# Patient Record
Sex: FEMALE | Race: BLACK OR AFRICAN AMERICAN | NOT HISPANIC OR LATINO | Employment: UNEMPLOYED | URBAN - METROPOLITAN AREA
[De-identification: names, ages, dates, MRNs, and addresses within clinical notes are randomized per-mention and may not be internally consistent; named-entity substitution may affect disease eponyms.]

---

## 2017-04-04 PROBLEM — E11.9 DIABETES MELLITUS: Status: ACTIVE | Noted: 2017-04-04

## 2018-05-08 PROBLEM — N39.46 MIXED INCONTINENCE: Status: ACTIVE | Noted: 2018-05-08

## 2018-06-26 ENCOUNTER — HOSPITAL ENCOUNTER (OUTPATIENT)
Dept: SLEEP MEDICINE | Facility: HOSPITAL | Age: 53
Discharge: HOME | End: 2018-06-26
Attending: INTERNAL MEDICINE
Payer: MEDICARE

## 2018-06-26 DIAGNOSIS — G47.8 SLEEP AROUSAL DISORDER: ICD-10-CM

## 2018-06-26 DIAGNOSIS — G47.33 OBSTRUCTIVE SLEEP APNEA (ADULT) (PEDIATRIC): ICD-10-CM

## 2018-06-26 PROCEDURE — 95810 POLYSOM 6/> YRS 4/> PARAM: CPT | Mod: 26,,, | Performed by: INTERNAL MEDICINE

## 2018-06-26 PROCEDURE — 95810 POLYSOM 6/> YRS 4/> PARAM: CPT

## 2018-08-22 ENCOUNTER — HOSPITAL ENCOUNTER (OUTPATIENT)
Dept: RADIOLOGY | Facility: HOSPITAL | Age: 53
Discharge: HOME | End: 2018-08-22
Attending: NURSE PRACTITIONER
Payer: MEDICARE

## 2018-08-22 DIAGNOSIS — R63.4 UNINTENTIONAL WEIGHT LOSS: ICD-10-CM

## 2018-08-22 DIAGNOSIS — R63.4 UNINTENTIONAL WEIGHT LOSS: Primary | ICD-10-CM

## 2018-08-22 PROCEDURE — 71046 X-RAY EXAM CHEST 2 VIEWS: CPT | Mod: TC,FY,PO

## 2018-08-24 ENCOUNTER — HOSPITAL ENCOUNTER (EMERGENCY)
Facility: HOSPITAL | Age: 53
Discharge: HOME | End: 2018-08-24
Attending: SURGERY
Payer: MEDICARE

## 2018-08-24 VITALS
TEMPERATURE: 98 F | OXYGEN SATURATION: 99 % | BODY MASS INDEX: 30.04 KG/M2 | SYSTOLIC BLOOD PRESSURE: 110 MMHG | HEIGHT: 60 IN | DIASTOLIC BLOOD PRESSURE: 60 MMHG | HEART RATE: 88 BPM | WEIGHT: 153 LBS | RESPIRATION RATE: 20 BRPM

## 2018-08-24 DIAGNOSIS — F20.0 PARANOID SCHIZOPHRENIA: ICD-10-CM

## 2018-08-24 DIAGNOSIS — R41.82 ALTERED MENTAL STATUS, UNSPECIFIED ALTERED MENTAL STATUS TYPE: Primary | ICD-10-CM

## 2018-08-24 DIAGNOSIS — F12.10 MARIJUANA ABUSE: ICD-10-CM

## 2018-08-24 LAB
ALBUMIN SERPL BCP-MCNC: 4.2 G/DL
ALP SERPL-CCNC: 69 U/L
ALT SERPL W/O P-5'-P-CCNC: 42 U/L
ANION GAP SERPL CALC-SCNC: 16 MMOL/L
AST SERPL-CCNC: 56 U/L
BASOPHILS # BLD AUTO: 0.02 K/UL
BASOPHILS NFR BLD: 0.2 %
BILIRUB SERPL-MCNC: 0.3 MG/DL
BUN SERPL-MCNC: 7 MG/DL
CALCIUM SERPL-MCNC: 9.1 MG/DL
CHLORIDE SERPL-SCNC: 103 MMOL/L
CO2 SERPL-SCNC: 18 MMOL/L
CREAT SERPL-MCNC: 0.79 MG/DL
DIFFERENTIAL METHOD: ABNORMAL
EOSINOPHIL # BLD AUTO: 0 K/UL
EOSINOPHIL NFR BLD: 0.3 %
ERYTHROCYTE [DISTWIDTH] IN BLOOD BY AUTOMATED COUNT: 12.7 %
EST. GFR  (AFRICAN AMERICAN): >60 ML/MIN/1.73 M^2
EST. GFR  (NON AFRICAN AMERICAN): >60 ML/MIN/1.73 M^2
GLUCOSE SERPL-MCNC: 91 MG/DL
HCT VFR BLD AUTO: 36.5 %
HGB BLD-MCNC: 12.7 G/DL
LYMPHOCYTES # BLD AUTO: 3.6 K/UL
LYMPHOCYTES NFR BLD: 31 %
MCH RBC QN AUTO: 31.7 PG
MCHC RBC AUTO-ENTMCNC: 34.8 G/DL
MCV RBC AUTO: 91 FL
MONOCYTES # BLD AUTO: 1.1 K/UL
MONOCYTES NFR BLD: 9 %
NEUTROPHILS # BLD AUTO: 7 K/UL
NEUTROPHILS NFR BLD: 59.3 %
PLATELET # BLD AUTO: 191 K/UL
PMV BLD AUTO: 10.3 FL
POTASSIUM SERPL-SCNC: 3.4 MMOL/L
PROT SERPL-MCNC: 7.8 G/DL
RBC # BLD AUTO: 4.01 M/UL
SODIUM SERPL-SCNC: 137 MMOL/L
WBC # BLD AUTO: 11.72 K/UL

## 2018-08-24 PROCEDURE — 25000003 PHARM REV CODE 250: Performed by: SURGERY

## 2018-08-24 PROCEDURE — 80053 COMPREHEN METABOLIC PANEL: CPT

## 2018-08-24 PROCEDURE — 85025 COMPLETE CBC W/AUTO DIFF WBC: CPT

## 2018-08-24 PROCEDURE — 96360 HYDRATION IV INFUSION INIT: CPT

## 2018-08-24 PROCEDURE — 99283 EMERGENCY DEPT VISIT LOW MDM: CPT | Mod: 25

## 2018-08-24 RX ORDER — LORAZEPAM 1 MG/1
0.5 TABLET ORAL EVERY 6 HOURS PRN
Qty: 10 TABLET | Refills: 0 | Status: SHIPPED | OUTPATIENT
Start: 2018-08-24 | End: 2019-03-18

## 2018-08-24 RX ORDER — PHENAZOPYRIDINE HYDROCHLORIDE 200 MG/1
200 TABLET, FILM COATED ORAL 3 TIMES DAILY
Qty: 10 TABLET | Refills: 0 | Status: SHIPPED | OUTPATIENT
Start: 2018-08-24 | End: 2018-09-03

## 2018-08-24 RX ADMIN — SODIUM CHLORIDE 1000 ML: 0.9 INJECTION, SOLUTION INTRAVENOUS at 05:08

## 2018-08-24 NOTE — ED NOTES
C/o blurred vision earlier today. States vision is normal now. Denies any pain or other complaints. BP low at 86/50. Pt appears high and admits to smoking a cigar with marijuana prior to arrival. Pt smells like marijuana.

## 2018-08-24 NOTE — ED PROVIDER NOTES
"Encounter Date: 2018       History     Chief Complaint   Patient presents with    Blurred Vision     Pt states started with blurred vision "a few minutes ago", denies N/V/D.  Pt denies weakness.  Pt states took Rx meds this am.       Patient had blurred vision after smoking a blunt 20 min prior to admission she has no headache or chest pain right now      The history is provided by the patient.   Toxidrome   The primary symptoms include altered mental status, visual change and dizziness. The current episode started just prior to arrival. This is a new problem. She was exposed to illicit drugs. The route of the exposure was oral ingestion. She was exposed to the toxic agent less than 1 hour ago.   The change in mental status began today.     Review of patient's allergies indicates:   Allergen Reactions    Naproxen Other (See Comments)     PATIENT EXPRESSED " THIS MEDICATION MAKES MY WHOLE BODY FEEL JITTERY."    Tramadol Other (See Comments)     PATIENT EXPRESSED " THIS MEDICATION MAKES MY WHOLE BODY FEEL JITTERY."     Past Medical History:   Diagnosis Date    Arthritis     Diabetes mellitus     Glaucoma     Hyperlipidemia     Hypertension     Mixed incontinence     Neuropathy     Schizophrenia, schizo-affective     Sleep apnea      Past Surgical History:   Procedure Laterality Date     SECTION      x1    GALL STONES      HERNIA REPAIR      HYSTERECTOMY       Family History   Problem Relation Age of Onset    Diabetes Father     Cancer Father     Diabetes Mother      Social History     Tobacco Use    Smoking status: Never Smoker    Smokeless tobacco: Never Used   Substance Use Topics    Alcohol use: Yes     Comment: beer-2-3/day    Drug use: Yes     Types: Marijuana     Review of Systems   Constitutional: Negative.    HENT: Negative.    Eyes: Negative.    Respiratory: Negative.    Cardiovascular: Negative.    Endocrine: Negative.    Genitourinary: Negative.    Musculoskeletal: " Negative.    Allergic/Immunologic: Negative.    Neurological: Positive for dizziness.   Psychiatric/Behavioral: Positive for agitation and decreased concentration.       Physical Exam     Initial Vitals [08/24/18 1617]   BP Pulse Resp Temp SpO2   (!) 82/53 97 20 97.6 °F (36.4 °C) 99 %      MAP       --         Physical Exam    Nursing note and vitals reviewed.  Constitutional: She appears well-developed and well-nourished.   HENT:   Head: Normocephalic.   Eyes: Conjunctivae are normal.   Cardiovascular: Normal rate, regular rhythm and normal heart sounds.   Pulmonary/Chest: Breath sounds normal.   Abdominal: Soft.   Neurological: She is alert.   Skin: Skin is dry.   Psychiatric: She has a normal mood and affect.         ED Course   Procedures  Labs Reviewed   CBC W/ AUTO DIFFERENTIAL - Abnormal; Notable for the following components:       Result Value    Hematocrit 36.5 (*)     MCH 31.7 (*)     Mono # 1.1 (*)     All other components within normal limits   COMPREHENSIVE METABOLIC PANEL          Imaging Results    None          Medical Decision Making:   Initial Assessment:   Altered mental status which resolved in ED  ED Management:  Recommend follow-up with primary doctor  Other:   I discussed test(s) with the performing physician.                      Clinical Impression:   The primary encounter diagnosis was Altered mental status, unspecified altered mental status type. Diagnoses of Paranoid schizophrenia and Marijuana abuse were also pertinent to this visit.      Disposition:   Disposition: Discharged  Condition: Stable                        CFlory Ingram III, MD  08/24/18 3530

## 2018-08-24 NOTE — ED PROVIDER NOTES
"Encounter Date: 2018       History     Chief Complaint   Patient presents with    Blurred Vision     Pt states started with blurred vision "a few minutes ago", denies N/V/D.  Pt denies weakness.  Pt states took Rx meds this am.       HPI  Review of patient's allergies indicates:   Allergen Reactions    Naproxen Other (See Comments)     PATIENT EXPRESSED " THIS MEDICATION MAKES MY WHOLE BODY FEEL JITTERY."    Tramadol Other (See Comments)     PATIENT EXPRESSED " THIS MEDICATION MAKES MY WHOLE BODY FEEL JITTERY."     Past Medical History:   Diagnosis Date    Arthritis     Diabetes mellitus     Glaucoma     Hyperlipidemia     Hypertension     Mixed incontinence     Neuropathy     Schizophrenia, schizo-affective     Sleep apnea      Past Surgical History:   Procedure Laterality Date     SECTION      x1    GALL STONES      HERNIA REPAIR      HYSTERECTOMY       Family History   Problem Relation Age of Onset    Diabetes Father     Cancer Father     Diabetes Mother      Social History     Tobacco Use    Smoking status: Never Smoker    Smokeless tobacco: Never Used   Substance Use Topics    Alcohol use: Yes     Comment: beer-2-3/day    Drug use: Yes     Types: Marijuana     Review of Systems    Physical Exam     Initial Vitals [18 1617]   BP Pulse Resp Temp SpO2   (!) 82/53 97 20 97.6 °F (36.4 °C) 99 %      MAP       --         Physical Exam    ED Course   Procedures  Labs Reviewed   CBC W/ AUTO DIFFERENTIAL - Abnormal; Notable for the following components:       Result Value    Hematocrit 36.5 (*)     MCH 31.7 (*)     Mono # 1.1 (*)     All other components within normal limits   COMPREHENSIVE METABOLIC PANEL          Imaging Results    None                               Clinical Impression:   The primary encounter diagnosis was Altered mental status, unspecified altered mental status type. Diagnoses of Paranoid schizophrenia and Marijuana abuse were also pertinent to this " visit.                             Zay Rosales MD  08/30/18 0521

## 2018-12-13 ENCOUNTER — LAB VISIT (OUTPATIENT)
Dept: LAB | Facility: HOSPITAL | Age: 53
End: 2018-12-13
Attending: PSYCHIATRY & NEUROLOGY
Payer: MEDICARE

## 2018-12-13 DIAGNOSIS — F20.0 PARANOID SCHIZOPHRENIA: Primary | ICD-10-CM

## 2018-12-13 DIAGNOSIS — Z79.899 OTHER LONG TERM (CURRENT) DRUG THERAPY: ICD-10-CM

## 2018-12-13 LAB
ALBUMIN SERPL BCP-MCNC: 4.3 G/DL
ALP SERPL-CCNC: 101 U/L
ALT SERPL W/O P-5'-P-CCNC: 30 U/L
ANION GAP SERPL CALC-SCNC: 8 MMOL/L
AST SERPL-CCNC: 28 U/L
BASOPHILS # BLD AUTO: 0.02 K/UL
BASOPHILS NFR BLD: 0.3 %
BILIRUB SERPL-MCNC: 0.4 MG/DL
BUN SERPL-MCNC: 11 MG/DL
CALCIUM SERPL-MCNC: 9.6 MG/DL
CHLORIDE SERPL-SCNC: 101 MMOL/L
CHOLEST SERPL-MCNC: 238 MG/DL
CHOLEST/HDLC SERPL: 4.3 {RATIO}
CO2 SERPL-SCNC: 28 MMOL/L
CREAT SERPL-MCNC: 0.73 MG/DL
DIFFERENTIAL METHOD: ABNORMAL
EOSINOPHIL # BLD AUTO: 0 K/UL
EOSINOPHIL NFR BLD: 0.5 %
ERYTHROCYTE [DISTWIDTH] IN BLOOD BY AUTOMATED COUNT: 12.7 %
EST. GFR  (AFRICAN AMERICAN): >60 ML/MIN/1.73 M^2
EST. GFR  (NON AFRICAN AMERICAN): >60 ML/MIN/1.73 M^2
GLUCOSE SERPL-MCNC: 124 MG/DL
HCT VFR BLD AUTO: 37.7 %
HDLC SERPL-MCNC: 55 MG/DL
HDLC SERPL: 23.1 %
HGB BLD-MCNC: 12.9 G/DL
LDLC SERPL CALC-MCNC: 166.8 MG/DL
LYMPHOCYTES # BLD AUTO: 3.4 K/UL
LYMPHOCYTES NFR BLD: 42.7 %
MCH RBC QN AUTO: 32.2 PG
MCHC RBC AUTO-ENTMCNC: 34.2 G/DL
MCV RBC AUTO: 94 FL
MONOCYTES # BLD AUTO: 0.6 K/UL
MONOCYTES NFR BLD: 7.8 %
NEUTROPHILS # BLD AUTO: 3.9 K/UL
NEUTROPHILS NFR BLD: 48.4 %
NONHDLC SERPL-MCNC: 183 MG/DL
PLATELET # BLD AUTO: 221 K/UL
PMV BLD AUTO: 9.9 FL
POTASSIUM SERPL-SCNC: 4.3 MMOL/L
PROT SERPL-MCNC: 8.1 G/DL
RBC # BLD AUTO: 4.01 M/UL
SODIUM SERPL-SCNC: 137 MMOL/L
T4 FREE SERPL-MCNC: 1.02 NG/DL
TRIGL SERPL-MCNC: 81 MG/DL
TSH SERPL DL<=0.005 MIU/L-ACNC: 4.14 UIU/ML
WBC # BLD AUTO: 7.96 K/UL

## 2018-12-13 PROCEDURE — 80061 LIPID PANEL: CPT

## 2018-12-13 PROCEDURE — 36415 COLL VENOUS BLD VENIPUNCTURE: CPT | Mod: PO

## 2018-12-13 PROCEDURE — 84439 ASSAY OF FREE THYROXINE: CPT

## 2018-12-13 PROCEDURE — 80053 COMPREHEN METABOLIC PANEL: CPT | Mod: PO

## 2018-12-13 PROCEDURE — 84443 ASSAY THYROID STIM HORMONE: CPT | Mod: PO

## 2018-12-13 PROCEDURE — 85025 COMPLETE CBC W/AUTO DIFF WBC: CPT | Mod: PO

## 2019-03-18 PROBLEM — R06.83 SNORING: Status: ACTIVE | Noted: 2019-03-18

## 2020-03-14 ENCOUNTER — OUTSIDE PLACE OF SERVICE (OUTPATIENT)
Dept: CARDIOLOGY | Facility: CLINIC | Age: 55
End: 2020-03-14
Payer: MEDICARE

## 2020-03-14 PROCEDURE — 93010 ELECTROCARDIOGRAM REPORT: CPT | Mod: S$GLB,,, | Performed by: INTERNAL MEDICINE

## 2020-03-14 PROCEDURE — 93010 PR ELECTROCARDIOGRAM REPORT: ICD-10-PCS | Mod: S$GLB,,, | Performed by: INTERNAL MEDICINE

## 2021-08-16 ENCOUNTER — HOSPITAL ENCOUNTER (OUTPATIENT)
Dept: RADIOLOGY | Facility: HOSPITAL | Age: 56
Discharge: HOME | End: 2021-08-16
Attending: NURSE PRACTITIONER
Payer: MEDICARE

## 2021-08-16 DIAGNOSIS — M79.673 HEEL PAIN: ICD-10-CM

## 2021-08-16 PROCEDURE — 73630 X-RAY EXAM OF FOOT: CPT | Mod: TC,FY,PO,RT

## 2022-04-06 ENCOUNTER — TELEPHONE (OUTPATIENT)
Dept: FAMILY MEDICINE | Facility: CLINIC | Age: 57
End: 2022-04-06
Payer: MEDICAID

## 2022-10-21 DIAGNOSIS — Z12.31 ENCOUNTER FOR SCREENING MAMMOGRAM FOR MALIGNANT NEOPLASM OF BREAST: Primary | ICD-10-CM

## 2022-11-11 ENCOUNTER — HOSPITAL ENCOUNTER (OUTPATIENT)
Dept: RADIOLOGY | Facility: HOSPITAL | Age: 57
Discharge: HOME | End: 2022-11-11
Attending: NURSE PRACTITIONER
Payer: MEDICARE

## 2022-11-11 DIAGNOSIS — Z12.31 ENCOUNTER FOR SCREENING MAMMOGRAM FOR MALIGNANT NEOPLASM OF BREAST: ICD-10-CM

## 2022-11-11 PROCEDURE — 77063 BREAST TOMOSYNTHESIS BI: CPT | Mod: TC,PO

## 2022-11-11 PROCEDURE — 77067 SCR MAMMO BI INCL CAD: CPT | Mod: TC,PO

## 2022-12-23 ENCOUNTER — LAB VISIT (OUTPATIENT)
Dept: LAB | Facility: HOSPITAL | Age: 57
End: 2022-12-23
Attending: PSYCHIATRY & NEUROLOGY
Payer: MEDICARE

## 2022-12-23 DIAGNOSIS — Z79.899 OTHER LONG TERM (CURRENT) DRUG THERAPY: ICD-10-CM

## 2022-12-23 DIAGNOSIS — F20.0 PARANOID SCHIZOPHRENIA: Primary | ICD-10-CM

## 2022-12-23 LAB
ALBUMIN SERPL BCP-MCNC: 4.3 G/DL (ref 3.5–5.2)
ALP SERPL-CCNC: 137 U/L (ref 38–126)
ALT SERPL W/O P-5'-P-CCNC: 48 U/L (ref 10–44)
ANION GAP SERPL CALC-SCNC: 5 MMOL/L (ref 8–16)
AST SERPL-CCNC: 36 U/L (ref 15–46)
BASOPHILS # BLD AUTO: 0.03 K/UL (ref 0–0.2)
BASOPHILS NFR BLD: 0.3 % (ref 0–1.9)
BILIRUB SERPL-MCNC: 0.5 MG/DL (ref 0.1–1)
CALCIUM SERPL-MCNC: 9.1 MG/DL (ref 8.7–10.5)
CHLORIDE SERPL-SCNC: 106 MMOL/L (ref 95–110)
CHOLEST SERPL-MCNC: 208 MG/DL (ref 120–199)
CHOLEST/HDLC SERPL: 3.4 {RATIO} (ref 2–5)
CO2 SERPL-SCNC: 30 MMOL/L (ref 23–29)
CREAT SERPL-MCNC: 0.65 MG/DL (ref 0.5–1.4)
DIFFERENTIAL METHOD: ABNORMAL
EOSINOPHIL # BLD AUTO: 0.1 K/UL (ref 0–0.5)
EOSINOPHIL NFR BLD: 0.8 % (ref 0–8)
ERYTHROCYTE [DISTWIDTH] IN BLOOD BY AUTOMATED COUNT: 12.4 % (ref 11.5–14.5)
EST. GFR  (NO RACE VARIABLE): >60 ML/MIN/1.73 M^2
ESTIMATED AVG GLUCOSE: 154 MG/DL (ref 68–131)
GLUCOSE SERPL-MCNC: 167 MG/DL (ref 70–110)
HBA1C MFR BLD: 7 % (ref 4–5.6)
HCT VFR BLD AUTO: 36.4 % (ref 37–48.5)
HDLC SERPL-MCNC: 61 MG/DL (ref 40–75)
HDLC SERPL: 29.3 % (ref 20–50)
HGB BLD-MCNC: 12.7 G/DL (ref 12–16)
IMM GRANULOCYTES # BLD AUTO: 0.04 K/UL (ref 0–0.04)
IMM GRANULOCYTES NFR BLD AUTO: 0.3 % (ref 0–0.5)
LDLC SERPL CALC-MCNC: 133.6 MG/DL (ref 63–159)
LYMPHOCYTES # BLD AUTO: 4.1 K/UL (ref 1–4.8)
LYMPHOCYTES NFR BLD: 35.6 % (ref 18–48)
MCH RBC QN AUTO: 32.8 PG (ref 27–31)
MCHC RBC AUTO-ENTMCNC: 34.9 G/DL (ref 32–36)
MCV RBC AUTO: 94 FL (ref 82–98)
MONOCYTES # BLD AUTO: 0.8 K/UL (ref 0.3–1)
MONOCYTES NFR BLD: 6.6 % (ref 4–15)
NEUTROPHILS # BLD AUTO: 6.5 K/UL (ref 1.8–7.7)
NEUTROPHILS NFR BLD: 56.4 % (ref 38–73)
NONHDLC SERPL-MCNC: 147 MG/DL
NRBC BLD-RTO: 0 /100 WBC
PLATELET # BLD AUTO: 157 K/UL (ref 150–450)
PMV BLD AUTO: 9.1 FL (ref 9.2–12.9)
POTASSIUM SERPL-SCNC: 4.3 MMOL/L (ref 3.5–5.1)
PROT SERPL-MCNC: 7.4 G/DL (ref 6–8.4)
RBC # BLD AUTO: 3.87 M/UL (ref 4–5.4)
SODIUM SERPL-SCNC: 141 MMOL/L (ref 136–145)
T4 FREE SERPL-MCNC: 0.84 NG/DL (ref 0.71–1.51)
TRIGL SERPL-MCNC: 67 MG/DL (ref 30–150)
TSH SERPL DL<=0.005 MIU/L-ACNC: 2.3 UIU/ML (ref 0.4–4)
UUN UR-MCNC: 8 MG/DL (ref 7–17)
WBC # BLD AUTO: 11.53 K/UL (ref 3.9–12.7)

## 2022-12-23 PROCEDURE — 84443 ASSAY THYROID STIM HORMONE: CPT | Mod: PO | Performed by: PSYCHIATRY & NEUROLOGY

## 2022-12-23 PROCEDURE — 84439 ASSAY OF FREE THYROXINE: CPT | Performed by: PSYCHIATRY & NEUROLOGY

## 2022-12-23 PROCEDURE — 85025 COMPLETE CBC W/AUTO DIFF WBC: CPT | Mod: PO | Performed by: PSYCHIATRY & NEUROLOGY

## 2022-12-23 PROCEDURE — 80053 COMPREHEN METABOLIC PANEL: CPT | Mod: PO | Performed by: PSYCHIATRY & NEUROLOGY

## 2022-12-23 PROCEDURE — 36415 COLL VENOUS BLD VENIPUNCTURE: CPT | Mod: PO | Performed by: PSYCHIATRY & NEUROLOGY

## 2022-12-23 PROCEDURE — 83036 HEMOGLOBIN GLYCOSYLATED A1C: CPT | Performed by: PSYCHIATRY & NEUROLOGY

## 2022-12-23 PROCEDURE — 80061 LIPID PANEL: CPT | Performed by: PSYCHIATRY & NEUROLOGY

## 2023-03-04 ENCOUNTER — HOSPITAL ENCOUNTER (EMERGENCY)
Facility: HOSPITAL | Age: 58
Discharge: HOME | End: 2023-03-04
Attending: EMERGENCY MEDICINE
Payer: MEDICARE

## 2023-03-04 VITALS
HEART RATE: 83 BPM | HEIGHT: 60 IN | BODY MASS INDEX: 31.02 KG/M2 | DIASTOLIC BLOOD PRESSURE: 88 MMHG | TEMPERATURE: 98 F | RESPIRATION RATE: 16 BRPM | OXYGEN SATURATION: 98 % | WEIGHT: 158 LBS | SYSTOLIC BLOOD PRESSURE: 169 MMHG

## 2023-03-04 DIAGNOSIS — R10.13 EPIGASTRIC ABDOMINAL PAIN: Primary | ICD-10-CM

## 2023-03-04 LAB
ALBUMIN SERPL BCP-MCNC: 4.4 G/DL (ref 3.5–5.2)
ALP SERPL-CCNC: 121 U/L (ref 38–126)
ALT SERPL W/O P-5'-P-CCNC: 38 U/L (ref 10–44)
ANION GAP SERPL CALC-SCNC: 6 MMOL/L (ref 8–16)
AST SERPL-CCNC: 35 U/L (ref 15–46)
BASOPHILS # BLD AUTO: 0.02 K/UL (ref 0–0.2)
BASOPHILS NFR BLD: 0.3 % (ref 0–1.9)
BILIRUB SERPL-MCNC: 0.5 MG/DL (ref 0.1–1)
BILIRUB UR QL STRIP: NEGATIVE
CALCIUM SERPL-MCNC: 8.9 MG/DL (ref 8.7–10.5)
CHLORIDE SERPL-SCNC: 105 MMOL/L (ref 95–110)
CLARITY UR REFRACT.AUTO: CLEAR
CO2 SERPL-SCNC: 29 MMOL/L (ref 23–29)
COLOR UR AUTO: YELLOW
CREAT SERPL-MCNC: 0.61 MG/DL (ref 0.5–1.4)
DIFFERENTIAL METHOD: ABNORMAL
EOSINOPHIL # BLD AUTO: 0.1 K/UL (ref 0–0.5)
EOSINOPHIL NFR BLD: 1.5 % (ref 0–8)
ERYTHROCYTE [DISTWIDTH] IN BLOOD BY AUTOMATED COUNT: 12 % (ref 11.5–14.5)
EST. GFR  (NO RACE VARIABLE): >60 ML/MIN/1.73 M^2
GLUCOSE SERPL-MCNC: 170 MG/DL (ref 70–110)
GLUCOSE UR QL STRIP: NEGATIVE
HCT VFR BLD AUTO: 36.1 % (ref 37–48.5)
HGB BLD-MCNC: 12.4 G/DL (ref 12–16)
HGB UR QL STRIP: ABNORMAL
IMM GRANULOCYTES # BLD AUTO: 0.01 K/UL (ref 0–0.04)
IMM GRANULOCYTES NFR BLD AUTO: 0.2 % (ref 0–0.5)
KETONES UR QL STRIP: NEGATIVE
LEUKOCYTE ESTERASE UR QL STRIP: NEGATIVE
LIPASE SERPL-CCNC: 63 U/L (ref 23–300)
LYMPHOCYTES # BLD AUTO: 2.6 K/UL (ref 1–4.8)
LYMPHOCYTES NFR BLD: 39.8 % (ref 18–48)
MCH RBC QN AUTO: 32.3 PG (ref 27–31)
MCHC RBC AUTO-ENTMCNC: 34.3 G/DL (ref 32–36)
MCV RBC AUTO: 94 FL (ref 82–98)
MONOCYTES # BLD AUTO: 0.6 K/UL (ref 0.3–1)
MONOCYTES NFR BLD: 9.3 % (ref 4–15)
NEUTROPHILS # BLD AUTO: 3.2 K/UL (ref 1.8–7.7)
NEUTROPHILS NFR BLD: 48.9 % (ref 38–73)
NITRITE UR QL STRIP: NEGATIVE
NRBC BLD-RTO: 0 /100 WBC
PH UR STRIP: 7 [PH] (ref 5–8)
PLATELET # BLD AUTO: 135 K/UL (ref 150–450)
PMV BLD AUTO: 9.8 FL (ref 9.2–12.9)
POTASSIUM SERPL-SCNC: 3.9 MMOL/L (ref 3.5–5.1)
PROT SERPL-MCNC: 7.8 G/DL (ref 6–8.4)
PROT UR QL STRIP: NEGATIVE
RBC # BLD AUTO: 3.84 M/UL (ref 4–5.4)
SODIUM SERPL-SCNC: 140 MMOL/L (ref 136–145)
SP GR UR STRIP: 1.01 (ref 1–1.03)
URN SPEC COLLECT METH UR: ABNORMAL
UROBILINOGEN UR STRIP-ACNC: NEGATIVE EU/DL
UUN UR-MCNC: 4 MG/DL (ref 7–17)
WBC # BLD AUTO: 6.54 K/UL (ref 3.9–12.7)

## 2023-03-04 PROCEDURE — 80053 COMPREHEN METABOLIC PANEL: CPT | Mod: ER | Performed by: EMERGENCY MEDICINE

## 2023-03-04 PROCEDURE — 83690 ASSAY OF LIPASE: CPT | Mod: ER | Performed by: EMERGENCY MEDICINE

## 2023-03-04 PROCEDURE — 81003 URINALYSIS AUTO W/O SCOPE: CPT | Mod: ER | Performed by: EMERGENCY MEDICINE

## 2023-03-04 PROCEDURE — 99284 EMERGENCY DEPT VISIT MOD MDM: CPT | Mod: ER

## 2023-03-04 PROCEDURE — 93005 ELECTROCARDIOGRAM TRACING: CPT | Mod: ER

## 2023-03-04 PROCEDURE — 93010 ELECTROCARDIOGRAM REPORT: CPT | Mod: ,,, | Performed by: INTERNAL MEDICINE

## 2023-03-04 PROCEDURE — 85025 COMPLETE CBC W/AUTO DIFF WBC: CPT | Mod: ER | Performed by: EMERGENCY MEDICINE

## 2023-03-04 PROCEDURE — 93010 EKG 12-LEAD: ICD-10-PCS | Mod: ,,, | Performed by: INTERNAL MEDICINE

## 2023-03-04 RX ORDER — PANTOPRAZOLE SODIUM 20 MG/1
20 TABLET, DELAYED RELEASE ORAL DAILY
Qty: 30 TABLET | Refills: 0 | Status: ON HOLD | OUTPATIENT
Start: 2023-03-04 | End: 2023-12-05 | Stop reason: HOSPADM

## 2023-03-04 NOTE — ED NOTES
"Pt to ED with CC of generalized upper abdominal cramping x "months. I just haven't gone to the doctor." No known aggravating factors. States "it just comes." No N/V/D. Denies urinary symptoms. Denies radiation of pain. Denies localization of pain - "sometimes it's up here (LUQ), here (epigastrium), or here (RUQ)." No tenderness noted with palpation. Abdomen soft and non-distended. Pt denies pain/symptoms at this time.  "

## 2023-03-04 NOTE — Clinical Note
"Katelynn Nagy" Castro was seen and treated in our emergency department on 3/4/2023.  She may return to work on 03/06/2023.       If you have any questions or concerns, please don't hesitate to call.      Latasha Morales RN    "

## 2023-03-04 NOTE — ED NOTES
Pt comfortably lying in bed. Respirations even and unlabored. Skin warm and dry. No pain reported at this time. NAD noted. Vital signs remain stable. Call light remains in reach. Pt instructed to notify staff should needs arise. Understanding verbalized.

## 2023-03-04 NOTE — PROVIDER PROGRESS NOTES - EMERGENCY DEPT.
Encounter Date: 3/4/2023    ED Physician Progress Notes        Physician Note:   Care assumed from Dr. Cobian at shift change.  57-year-old with intermittent abdominal pain for several weeks.  Vital signs unremarkable.  Patient without pain while in the ED.  Labs show no significant abnormalities.  No indication for imaging at this time.  Abdomen remains benign.  Low suspicion for acute life-threatening illness.  Will discharge patient home with prescription for Protonix and referral to GI for further evaluation.

## 2023-03-04 NOTE — ED PROVIDER NOTES
"ED Provider Note - 3/4/2023    History     Chief Complaint   Patient presents with    Abdominal Pain     Pt to the Er with episodic "cramping in stomach." Pt reports that this has been going on "longer than a month." First episode tonight about 8pm. Pt went lay down, but it woke her again prior to coming to the ER. Pt denies pain in triage.      Patient currently presents with complaint of abdominal pain.  Onset of this event was first noted several weeks ago though symptoms are intermittent averaging once every 1-2 days.  Episodes typically last about 5-10 min each.  This discomfort is reported to be primarily epigastric.  This discomfort is described as a aching and sharp sensation.  Patient notes no associated symptoms.  PSH notable for prior lap cholecystectomy and hysterectomy as well as ventral hernia repair.  PMH notable for DM and HTN.          Review of patient's allergies indicates:   Allergen Reactions    Naproxen Other (See Comments)     PATIENT EXPRESSED " THIS MEDICATION MAKES MY WHOLE BODY FEEL JITTERY."    Tramadol Other (See Comments)     PATIENT EXPRESSED " THIS MEDICATION MAKES MY WHOLE BODY FEEL JITTERY."     Past Medical History:   Diagnosis Date    Arthritis     Diabetes mellitus     Glaucoma     Hyperlipidemia     Hypertension     Mixed incontinence     Neuropathy     Schizophrenia, schizo-affective     Sleep apnea      Past Surgical History:   Procedure Laterality Date     SECTION      x1    CYSTOSCOPY  2018    Procedure: CYSTOSCOPY;  Surgeon: Bebeto Chaves II, MD;  Location: [facility];  Service: Urology;;    GALL STONES      HERNIA REPAIR      HYSTERECTOMY      INSERTION OF MIDURETHRAL SLING N/A 2018    Procedure: SLING-MID URETHRAL;  Surgeon: Bebeto Chaves II, MD;  Location: [facility] OR;  Service: Urology;  Laterality: N/A;  Site: midurethral     Family History   Problem Relation Age of Onset    Diabetes Father     Cancer Father     Diabetes Mother      Social History "     Tobacco Use    Smoking status: Never    Smokeless tobacco: Never   Substance Use Topics    Alcohol use: Yes     Comment: beer-2-3/day    Drug use: Yes     Types: Marijuana     Review of Systems   Constitutional:  Negative for chills and fever.   HENT:  Negative for congestion and rhinorrhea.    Respiratory:  Negative for cough and shortness of breath.    Cardiovascular:  Negative for chest pain and palpitations.   Gastrointestinal:  Positive for abdominal pain. Negative for diarrhea and vomiting.   Genitourinary:  Negative for difficulty urinating and dysuria.   Skin:  Negative for color change and rash.   Neurological:  Negative for dizziness and light-headedness.   Hematological:  Negative for adenopathy. Does not bruise/bleed easily.     Physical Exam     Initial Vitals [03/04/23 0349]   BP Pulse Resp Temp SpO2   (!) 140/76 83 18 98.3 °F (36.8 °C) 98 %      MAP       --         Vitals:    03/04/23 0349 03/04/23 0402 03/04/23 0502 03/04/23 0602   BP: (!) 140/76 (!) 148/67 (!) 153/75 (!) 162/83   Pulse: 83 77 78 74   Resp: 18   15   Temp: 98.3 °F (36.8 °C)      TempSrc: Oral      SpO2: 98% 98% 99% 97%   Weight: 71.7 kg (158 lb)      Height: 5' (1.524 m)       03/04/23 0701 03/04/23 0703   BP:  (!) 169/88   Pulse: 76 83   Resp: 16    Temp:     TempSrc:     SpO2: 98% 98%   Weight:     Height:       Physical Exam    Nursing note and vitals reviewed.  Constitutional: She appears well-developed and well-nourished. She is not diaphoretic. No distress.   HENT:   Head: Normocephalic and atraumatic.   Nose: Nose normal.   Mouth/Throat: Oropharynx is clear and moist.   Eyes: Conjunctivae are normal. No scleral icterus.   Cardiovascular:  Normal rate, regular rhythm, normal heart sounds and intact distal pulses.           Pulmonary/Chest: Breath sounds normal. No respiratory distress.   Abdominal: Abdomen is soft. She exhibits no distension. There is no abdominal tenderness.   Musculoskeletal:         General: No edema.  Normal range of motion.     Neurological: She is alert and oriented to person, place, and time. She has normal strength.   Skin: Skin is warm and dry.       ED Course   Procedures                   MDM  History Acquisition     The patient's list of active medical problems, social history, medications, and allergies as documented per RN staff has been reviewed.       Differential Diagnoses   Based on available information and the initial assessment, the working differential diagnoses considered during this evaluation include but are not limited to constipation,  pancreatitis, PUD, gastritis, enteritis/colitis, and GERD.      LABS     Labs Reviewed   CBC W/ AUTO DIFFERENTIAL - Abnormal; Notable for the following components:       Result Value    RBC 3.84 (*)     Hematocrit 36.1 (*)     MCH 32.3 (*)     Platelets 135 (*)     All other components within normal limits   COMPREHENSIVE METABOLIC PANEL - Abnormal; Notable for the following components:    Glucose 170 (*)     BUN 4 (*)     Anion Gap 6 (*)     All other components within normal limits   URINALYSIS, REFLEX TO URINE CULTURE - Abnormal; Notable for the following components:    Occult Blood UA Trace (*)     All other components within normal limits    Narrative:     Preferred Collection Type->Urine, Clean Catch  Specimen Source->Urine   LIPASE     All available results from the labs ordered were independently reviewed.  No apparent abnormalities of clinical relevancy were noted from the CBC, CMP, lipase, and UAR.  Notable abnormalities were appreciated in none of the present labwork.    Imaging     Imaging Results    None            EKG   EKG Readings: (Independently Interpreted)   Initial Reading: No STEMI. Rhythm: Normal Sinus Rhythm. Heart Rate: 87. Ectopy: No Ectopy. Conduction: Normal. Axis: Normal.     Additional Consideration           Medications - No data to display                ED Management/Discussion   Following discussion of all pertinent details  presently available from the patient's encounter, the patient was transitioned into the care of Dr.S Gibbs for ongoing evaluation and management pending completion of workup.      CLINICAL IMPRESSION    ICD-10-CM ICD-9-CM   1. Epigastric abdominal pain  R10.13 789.06          ED Disposition Condition    Discharge Stable               Isaiah Cobian MD  03/04/23 1553       Isaiah Cobian MD  03/04/23 4627

## 2023-11-07 ENCOUNTER — HOSPITAL ENCOUNTER (EMERGENCY)
Facility: HOSPITAL | Age: 58
Discharge: HOME | End: 2023-11-07
Attending: EMERGENCY MEDICINE
Payer: MEDICARE

## 2023-11-07 ENCOUNTER — HOSPITAL ENCOUNTER (OUTPATIENT)
Dept: RADIOLOGY | Facility: HOSPITAL | Age: 58
Discharge: HOME | End: 2023-11-07
Attending: NURSE PRACTITIONER
Payer: MEDICARE

## 2023-11-07 VITALS
TEMPERATURE: 98 F | HEIGHT: 60 IN | BODY MASS INDEX: 31.41 KG/M2 | DIASTOLIC BLOOD PRESSURE: 73 MMHG | RESPIRATION RATE: 18 BRPM | SYSTOLIC BLOOD PRESSURE: 112 MMHG | HEART RATE: 92 BPM | WEIGHT: 160 LBS | OXYGEN SATURATION: 99 %

## 2023-11-07 DIAGNOSIS — M79.604 RIGHT LEG PAIN: Primary | ICD-10-CM

## 2023-11-07 DIAGNOSIS — Z78.0 ASYMPTOMATIC POSTMENOPAUSAL STATE: ICD-10-CM

## 2023-11-07 DIAGNOSIS — M79.605 LEFT LEG PAIN: ICD-10-CM

## 2023-11-07 DIAGNOSIS — R29.6 FREQUENT FALLS: ICD-10-CM

## 2023-11-07 PROCEDURE — 77081 DXA BONE DENSITY APPENDICULR: CPT | Mod: TC,PN

## 2023-11-07 PROCEDURE — 63600175 PHARM REV CODE 636 W HCPCS: Mod: ER | Performed by: PHYSICIAN ASSISTANT

## 2023-11-07 PROCEDURE — 77081 DEXA BONE DENSITY APPENDICULAR SKELETON: ICD-10-PCS | Mod: 26,,, | Performed by: RADIOLOGY

## 2023-11-07 PROCEDURE — 77081 DXA BONE DENSITY APPENDICULR: CPT | Mod: 26,,, | Performed by: RADIOLOGY

## 2023-11-07 PROCEDURE — 96372 THER/PROPH/DIAG INJ SC/IM: CPT | Performed by: PHYSICIAN ASSISTANT

## 2023-11-07 PROCEDURE — 99284 EMERGENCY DEPT VISIT MOD MDM: CPT | Mod: 25,ER

## 2023-11-07 RX ORDER — KETOROLAC TROMETHAMINE 10 MG/1
10 TABLET, FILM COATED ORAL 2 TIMES DAILY
Qty: 10 TABLET | Refills: 0 | Status: SHIPPED | OUTPATIENT
Start: 2023-11-07 | End: 2023-11-12

## 2023-11-07 RX ORDER — KETOROLAC TROMETHAMINE 30 MG/ML
15 INJECTION, SOLUTION INTRAMUSCULAR; INTRAVENOUS
Status: COMPLETED | OUTPATIENT
Start: 2023-11-07 | End: 2023-11-07

## 2023-11-07 RX ADMIN — KETOROLAC TROMETHAMINE 15 MG: 30 INJECTION, SOLUTION INTRAMUSCULAR; INTRAVENOUS at 09:11

## 2023-11-07 NOTE — ED PROVIDER NOTES
"Encounter Date: 2023       History     Chief Complaint   Patient presents with    Leg Pain     Pt C/O "bone" pain to BLE from knee to feet X months.  Pt denies injury.  No abnormalities noted.      58-year-old patient presents with pain in both legs.  Patient denies any injury.  Patient states that she is been having this on and for months.  Patient does have a history of diabetes but states she no longer has it.  Patient states that she had a bone density scan test done today by her primary care.  The patient's results with his test are not done yet.  Patient has taken Tylenol and states that it has not helped with the pain.  Patient denied any other complaints.    The history is provided by the patient.     Review of patient's allergies indicates:   Allergen Reactions    Naproxen Other (See Comments)     PATIENT EXPRESSED " THIS MEDICATION MAKES MY WHOLE BODY FEEL JITTERY."    Tramadol Other (See Comments)     PATIENT EXPRESSED " THIS MEDICATION MAKES MY WHOLE BODY FEEL JITTERY."     Past Medical History:   Diagnosis Date    Arthritis     Diabetes mellitus     Glaucoma     Hyperlipidemia     Hypertension     Mixed incontinence     Neuropathy     Schizophrenia, schizo-affective     Sleep apnea      Past Surgical History:   Procedure Laterality Date     SECTION      x1    CYSTOSCOPY  2018    Procedure: CYSTOSCOPY;  Surgeon: Bebeto Chaves II, MD;  Location: [facility];  Service: Urology;;    GALL STONES      HERNIA REPAIR      HYSTERECTOMY      INSERTION OF MIDURETHRAL SLING N/A 2018    Procedure: SLING-MID URETHRAL;  Surgeon: Bebeto Chaves II, MD;  Location: [facility];  Service: Urology;  Laterality: N/A;  Site: midurethral     Family History   Problem Relation Age of Onset    Diabetes Father     Cancer Father     Diabetes Mother      Social History     Tobacco Use    Smoking status: Never    Smokeless tobacco: Never   Substance Use Topics    Alcohol use: Yes     Comment: beer-2-3/day    Drug " use: Yes     Types: Marijuana     Review of Systems   Constitutional: Negative.    HENT: Negative.     Eyes: Negative.    Respiratory: Negative.     Cardiovascular: Negative.    Gastrointestinal: Negative.    Endocrine: Negative.    Genitourinary: Negative.    Musculoskeletal: Negative.         Leg pain     Skin: Negative.    Allergic/Immunologic: Negative.    Neurological: Negative.    Hematological: Negative.    Psychiatric/Behavioral: Negative.         Physical Exam     Initial Vitals [11/07/23 0841]   BP Pulse Resp Temp SpO2   114/69 99 18 98.7 °F (37.1 °C) 96 %      MAP       --         Physical Exam    Constitutional: Vital signs are normal. She appears well-developed and well-nourished.   HENT:   Head: Normocephalic.   Eyes: Conjunctivae and lids are normal.   Neck: Trachea normal.    Full passive range of motion without pain.     Cardiovascular:  Normal rate and regular rhythm.           Pulmonary/Chest: Effort normal and breath sounds normal.   Musculoskeletal:      Cervical back: Full passive range of motion without pain.      Right lower leg: Tenderness present. No deformity, lacerations or bony tenderness.      Left lower leg: Tenderness present. No deformity, lacerations or bony tenderness.        Legs:            ED Course   Procedures  Labs Reviewed - No data to display       Imaging Results    None          Medications   ketorolac injection 15 mg (15 mg Intramuscular Given 11/7/23 0934)     Medical Decision Making  58-year-old patient presents with pain in both legs.  Patient denies any injury.  Patient states that she is been having this on and for months.  Patient does have a history of diabetes but states she no longer has it.  Patient states that she had a bone density scan test done today by her primary care.  The patient's results with his test are not done yet.  Patient has taken Tylenol and states that it has not helped with the pain.  Patient denied any other complaints.  Physical exam did  not show any signs of blood clots.  Administered toradol and told patient to follow up with podiatry.                                 Clinical Impression:   Final diagnoses:  [M79.604] Right leg pain (Primary)  [M79.605] Left leg pain        ED Disposition Condition    Discharge Stable          ED Prescriptions       Medication Sig Dispense Start Date End Date Auth. Provider    ketorolac (TORADOL) 10 mg tablet Take 1 tablet (10 mg total) by mouth 2 (two) times a day. for 5 days 10 tablet 11/7/2023 11/12/2023 Daren Caceres PA-C          Follow-up Information    None          Daren Caceres PA-C  11/07/23 6159

## 2023-12-03 ENCOUNTER — HOSPITAL ENCOUNTER (INPATIENT)
Facility: HOSPITAL | Age: 58
LOS: 3 days | Discharge: HOME | DRG: 639 | End: 2023-12-06
Attending: STUDENT IN AN ORGANIZED HEALTH CARE EDUCATION/TRAINING PROGRAM | Admitting: INTERNAL MEDICINE
Payer: MEDICARE

## 2023-12-03 DIAGNOSIS — E11.10 DKA (DIABETIC KETOACIDOSIS): ICD-10-CM

## 2023-12-03 DIAGNOSIS — E13.10 DIABETIC KETOACIDOSIS WITHOUT COMA ASSOCIATED WITH OTHER SPECIFIED DIABETES MELLITUS: Primary | ICD-10-CM

## 2023-12-03 LAB
ALBUMIN SERPL BCP-MCNC: 5.4 G/DL (ref 3.5–5.2)
ALLENS TEST: ABNORMAL
ALP SERPL-CCNC: 140 U/L (ref 38–126)
ALT SERPL W/O P-5'-P-CCNC: 52 U/L (ref 10–44)
ANION GAP SERPL CALC-SCNC: 11 MMOL/L (ref 8–16)
ANION GAP SERPL CALC-SCNC: 16 MMOL/L (ref 8–16)
ANION GAP SERPL CALC-SCNC: 30 MMOL/L (ref 8–16)
AST SERPL-CCNC: 28 U/L (ref 15–46)
B-OH-BUTYR BLD STRIP-SCNC: 5.5 MMOL/L (ref 0–0.5)
BACTERIA #/AREA URNS AUTO: ABNORMAL /HPF
BASOPHILS # BLD AUTO: 0.04 K/UL (ref 0–0.2)
BASOPHILS NFR BLD: 0.2 % (ref 0–1.9)
BILIRUB SERPL-MCNC: 0.8 MG/DL (ref 0.1–1)
BILIRUB UR QL STRIP: NEGATIVE
BUN SERPL-MCNC: 10 MG/DL (ref 6–20)
CALCIUM SERPL-MCNC: 10 MG/DL (ref 8.7–10.5)
CALCIUM SERPL-MCNC: 8.5 MG/DL (ref 8.7–10.5)
CALCIUM SERPL-MCNC: 8.5 MG/DL (ref 8.7–10.5)
CHLORIDE SERPL-SCNC: 104 MMOL/L (ref 95–110)
CHLORIDE SERPL-SCNC: 109 MMOL/L (ref 95–110)
CHLORIDE SERPL-SCNC: 112 MMOL/L (ref 95–110)
CLARITY UR REFRACT.AUTO: CLEAR
CO2 SERPL-SCNC: 13 MMOL/L (ref 23–29)
CO2 SERPL-SCNC: 17 MMOL/L (ref 23–29)
CO2 SERPL-SCNC: 7 MMOL/L (ref 23–29)
COLOR UR AUTO: YELLOW
CREAT SERPL-MCNC: 0.61 MG/DL (ref 0.5–1.4)
CREAT SERPL-MCNC: 0.9 MG/DL (ref 0.5–1.4)
CREAT SERPL-MCNC: 0.95 MG/DL (ref 0.5–1.4)
DELSYS: ABNORMAL
DIFFERENTIAL METHOD: ABNORMAL
EOSINOPHIL # BLD AUTO: 0 K/UL (ref 0–0.5)
EOSINOPHIL NFR BLD: 0 % (ref 0–8)
ERYTHROCYTE [DISTWIDTH] IN BLOOD BY AUTOMATED COUNT: 12.9 % (ref 11.5–14.5)
EST. GFR  (NO RACE VARIABLE): >60 ML/MIN/1.73 M^2
ESTIMATED AVG GLUCOSE: 200 MG/DL (ref 68–131)
GLUCOSE SERPL-MCNC: 187 MG/DL (ref 70–110)
GLUCOSE SERPL-MCNC: 202 MG/DL (ref 70–110)
GLUCOSE SERPL-MCNC: 352 MG/DL (ref 70–110)
GLUCOSE UR QL STRIP: ABNORMAL
HBA1C MFR BLD: 8.6 % (ref 4–5.6)
HCO3 UR-SCNC: 14.1 MMOL/L (ref 24–28)
HCT VFR BLD AUTO: 44.2 % (ref 37–48.5)
HCT VFR BLD CALC: 44 %PCV (ref 36–54)
HGB BLD-MCNC: 15 G/DL
HGB BLD-MCNC: 15.6 G/DL (ref 12–16)
HGB UR QL STRIP: ABNORMAL
HYALINE CASTS UR QL AUTO: 0 /LPF
IMM GRANULOCYTES # BLD AUTO: 0.22 K/UL (ref 0–0.04)
IMM GRANULOCYTES NFR BLD AUTO: 1.3 % (ref 0–0.5)
INFLUENZA A, MOLECULAR: NEGATIVE
INFLUENZA B, MOLECULAR: NEGATIVE
KETONES UR QL STRIP: ABNORMAL
LEUKOCYTE ESTERASE UR QL STRIP: NEGATIVE
LIPASE SERPL-CCNC: 84 U/L (ref 23–300)
LYMPHOCYTES # BLD AUTO: 2.1 K/UL (ref 1–4.8)
LYMPHOCYTES NFR BLD: 12.3 % (ref 18–48)
MCH RBC QN AUTO: 32.6 PG (ref 27–31)
MCHC RBC AUTO-ENTMCNC: 35.3 G/DL (ref 32–36)
MCV RBC AUTO: 93 FL (ref 82–98)
MICROSCOPIC COMMENT: ABNORMAL
MONOCYTES # BLD AUTO: 0.7 K/UL (ref 0.3–1)
MONOCYTES NFR BLD: 4.3 % (ref 4–15)
NEUTROPHILS # BLD AUTO: 13.9 K/UL (ref 1.8–7.7)
NEUTROPHILS NFR BLD: 81.9 % (ref 38–73)
NITRITE UR QL STRIP: NEGATIVE
NRBC BLD-RTO: 0 /100 WBC
PCO2 BLDA: 31 MMHG (ref 35–45)
PH SMN: 7.26 [PH] (ref 7.35–7.45)
PH UR STRIP: 6 [PH] (ref 5–8)
PLATELET # BLD AUTO: 279 K/UL (ref 150–450)
PMV BLD AUTO: 9.9 FL (ref 9.2–12.9)
PO2 BLDA: 40 MMHG (ref 40–60)
POC BE: -13 MMOL/L
POC SATURATED O2: 69 % (ref 95–100)
POC TCO2: 15 MMOL/L (ref 24–29)
POCT GLUCOSE: 146 MG/DL (ref 70–110)
POCT GLUCOSE: 169 MG/DL (ref 70–110)
POCT GLUCOSE: 195 MG/DL (ref 70–110)
POCT GLUCOSE: 201 MG/DL (ref 70–110)
POCT GLUCOSE: 214 MG/DL (ref 70–110)
POCT GLUCOSE: 225 MG/DL (ref 70–110)
POCT GLUCOSE: 307 MG/DL (ref 70–110)
POTASSIUM BLD-SCNC: 5.1 MMOL/L (ref 3.5–5.1)
POTASSIUM SERPL-SCNC: 4.5 MMOL/L (ref 3.5–5.1)
POTASSIUM SERPL-SCNC: 4.5 MMOL/L (ref 3.5–5.1)
POTASSIUM SERPL-SCNC: 5 MMOL/L (ref 3.5–5.1)
PROT SERPL-MCNC: 10.6 G/DL (ref 6–8.4)
PROT UR QL STRIP: ABNORMAL
RBC # BLD AUTO: 4.78 M/UL (ref 4–5.4)
RBC #/AREA URNS AUTO: 5 /HPF (ref 0–4)
SAMPLE: ABNORMAL
SARS-COV-2 RDRP RESP QL NAA+PROBE: NEGATIVE
SITE: ABNORMAL
SODIUM BLD-SCNC: 137 MMOL/L (ref 136–145)
SODIUM SERPL-SCNC: 137 MMOL/L (ref 136–145)
SODIUM SERPL-SCNC: 141 MMOL/L (ref 136–145)
SODIUM SERPL-SCNC: 141 MMOL/L (ref 136–145)
SP GR UR STRIP: 1.02 (ref 1–1.03)
SPECIMEN SOURCE: NORMAL
URN SPEC COLLECT METH UR: ABNORMAL
UROBILINOGEN UR STRIP-ACNC: NEGATIVE EU/DL
UUN UR-MCNC: 11 MG/DL (ref 7–17)
UUN UR-MCNC: 15 MG/DL (ref 7–17)
WBC # BLD AUTO: 16.93 K/UL (ref 3.9–12.7)
WBC #/AREA URNS AUTO: 2 /HPF (ref 0–5)
YEAST UR QL AUTO: ABNORMAL

## 2023-12-03 PROCEDURE — 25000003 PHARM REV CODE 250: Mod: ER | Performed by: STUDENT IN AN ORGANIZED HEALTH CARE EDUCATION/TRAINING PROGRAM

## 2023-12-03 PROCEDURE — 63600175 PHARM REV CODE 636 W HCPCS: Mod: ER

## 2023-12-03 PROCEDURE — 25000003 PHARM REV CODE 250: Performed by: STUDENT IN AN ORGANIZED HEALTH CARE EDUCATION/TRAINING PROGRAM

## 2023-12-03 PROCEDURE — 83036 HEMOGLOBIN GLYCOSYLATED A1C: CPT | Performed by: STUDENT IN AN ORGANIZED HEALTH CARE EDUCATION/TRAINING PROGRAM

## 2023-12-03 PROCEDURE — 96360 HYDRATION IV INFUSION INIT: CPT | Mod: ER

## 2023-12-03 PROCEDURE — 63600175 PHARM REV CODE 636 W HCPCS: Performed by: STUDENT IN AN ORGANIZED HEALTH CARE EDUCATION/TRAINING PROGRAM

## 2023-12-03 PROCEDURE — 83690 ASSAY OF LIPASE: CPT | Mod: ER | Performed by: NURSE PRACTITIONER

## 2023-12-03 PROCEDURE — 82803 BLOOD GASES ANY COMBINATION: CPT | Mod: ER

## 2023-12-03 PROCEDURE — U0002 COVID-19 LAB TEST NON-CDC: HCPCS | Mod: ER | Performed by: STUDENT IN AN ORGANIZED HEALTH CARE EDUCATION/TRAINING PROGRAM

## 2023-12-03 PROCEDURE — 82010 KETONE BODYS QUAN: CPT | Mod: ER | Performed by: NURSE PRACTITIONER

## 2023-12-03 PROCEDURE — 80048 BASIC METABOLIC PNL TOTAL CA: CPT | Mod: 91,ER,XB | Performed by: FAMILY MEDICINE

## 2023-12-03 PROCEDURE — 25000003 PHARM REV CODE 250: Mod: ER

## 2023-12-03 PROCEDURE — 20000000 HC ICU ROOM

## 2023-12-03 PROCEDURE — 80048 BASIC METABOLIC PNL TOTAL CA: CPT | Mod: XB | Performed by: STUDENT IN AN ORGANIZED HEALTH CARE EDUCATION/TRAINING PROGRAM

## 2023-12-03 PROCEDURE — 36415 COLL VENOUS BLD VENIPUNCTURE: CPT | Performed by: STUDENT IN AN ORGANIZED HEALTH CARE EDUCATION/TRAINING PROGRAM

## 2023-12-03 PROCEDURE — S5010 5% DEXTROSE AND 0.45% SALINE: HCPCS | Performed by: STUDENT IN AN ORGANIZED HEALTH CARE EDUCATION/TRAINING PROGRAM

## 2023-12-03 PROCEDURE — 25000003 PHARM REV CODE 250

## 2023-12-03 PROCEDURE — 25000003 PHARM REV CODE 250: Mod: ER | Performed by: NURSE PRACTITIONER

## 2023-12-03 PROCEDURE — 99900035 HC TECH TIME PER 15 MIN (STAT): Mod: ER

## 2023-12-03 PROCEDURE — S5010 5% DEXTROSE AND 0.45% SALINE: HCPCS

## 2023-12-03 PROCEDURE — 81000 URINALYSIS NONAUTO W/SCOPE: CPT | Mod: ER | Performed by: NURSE PRACTITIONER

## 2023-12-03 PROCEDURE — 80053 COMPREHEN METABOLIC PANEL: CPT | Mod: ER | Performed by: NURSE PRACTITIONER

## 2023-12-03 PROCEDURE — 96361 HYDRATE IV INFUSION ADD-ON: CPT | Mod: ER

## 2023-12-03 PROCEDURE — 85025 COMPLETE CBC W/AUTO DIFF WBC: CPT | Mod: ER | Performed by: NURSE PRACTITIONER

## 2023-12-03 PROCEDURE — 87502 INFLUENZA DNA AMP PROBE: CPT | Mod: ER | Performed by: STUDENT IN AN ORGANIZED HEALTH CARE EDUCATION/TRAINING PROGRAM

## 2023-12-03 PROCEDURE — 99291 CRITICAL CARE FIRST HOUR: CPT | Mod: ER

## 2023-12-03 PROCEDURE — 94760 N-INVAS EAR/PLS OXIMETRY 1: CPT | Mod: ER,XB

## 2023-12-03 PROCEDURE — 82962 GLUCOSE BLOOD TEST: CPT | Mod: ER

## 2023-12-03 RX ORDER — ENOXAPARIN SODIUM 100 MG/ML
40 INJECTION SUBCUTANEOUS EVERY 24 HOURS
Status: DISCONTINUED | OUTPATIENT
Start: 2023-12-04 | End: 2023-12-06 | Stop reason: HOSPADM

## 2023-12-03 RX ORDER — LOSARTAN POTASSIUM 50 MG/1
50 TABLET ORAL DAILY
Status: DISCONTINUED | OUTPATIENT
Start: 2023-12-04 | End: 2023-12-06 | Stop reason: HOSPADM

## 2023-12-03 RX ORDER — DEXTROSE MONOHYDRATE AND SODIUM CHLORIDE 5; .45 G/100ML; G/100ML
125 INJECTION, SOLUTION INTRAVENOUS CONTINUOUS PRN
Status: DISCONTINUED | OUTPATIENT
Start: 2023-12-03 | End: 2023-12-03

## 2023-12-03 RX ORDER — DEXTROSE MONOHYDRATE 100 MG/ML
INJECTION, SOLUTION INTRAVENOUS
Status: DISCONTINUED | OUTPATIENT
Start: 2023-12-03 | End: 2023-12-03

## 2023-12-03 RX ORDER — DEXTROSE MONOHYDRATE AND SODIUM CHLORIDE 5; .45 G/100ML; G/100ML
INJECTION, SOLUTION INTRAVENOUS CONTINUOUS PRN
Status: DISCONTINUED | OUTPATIENT
Start: 2023-12-03 | End: 2023-12-03

## 2023-12-03 RX ORDER — POTASSIUM CHLORIDE 7.45 MG/ML
40 INJECTION INTRAVENOUS
Status: DISCONTINUED | OUTPATIENT
Start: 2023-12-03 | End: 2023-12-03

## 2023-12-03 RX ORDER — GLUCAGON 1 MG
1 KIT INJECTION
Status: DISCONTINUED | OUTPATIENT
Start: 2023-12-04 | End: 2023-12-06 | Stop reason: HOSPADM

## 2023-12-03 RX ORDER — SODIUM CHLORIDE 0.9 % (FLUSH) 0.9 %
10 SYRINGE (ML) INJECTION
Status: DISCONTINUED | OUTPATIENT
Start: 2023-12-03 | End: 2023-12-03

## 2023-12-03 RX ORDER — SODIUM CHLORIDE 0.9 % (FLUSH) 0.9 %
10 SYRINGE (ML) INJECTION
Status: DISCONTINUED | OUTPATIENT
Start: 2023-12-03 | End: 2023-12-06 | Stop reason: HOSPADM

## 2023-12-03 RX ORDER — EMPAGLIFLOZIN 10 MG/1
10 TABLET, FILM COATED ORAL DAILY
COMMUNITY
Start: 2023-11-15

## 2023-12-03 RX ORDER — ONDANSETRON 2 MG/ML
4 INJECTION INTRAMUSCULAR; INTRAVENOUS
Status: COMPLETED | OUTPATIENT
Start: 2023-12-03 | End: 2023-12-03

## 2023-12-03 RX ORDER — SODIUM CHLORIDE 9 MG/ML
1000 INJECTION, SOLUTION INTRAVENOUS CONTINUOUS
Status: DISCONTINUED | OUTPATIENT
Start: 2023-12-03 | End: 2023-12-03

## 2023-12-03 RX ORDER — ACETAMINOPHEN 325 MG/1
650 TABLET ORAL EVERY 4 HOURS PRN
Status: DISCONTINUED | OUTPATIENT
Start: 2023-12-03 | End: 2023-12-06 | Stop reason: HOSPADM

## 2023-12-03 RX ORDER — MUPIROCIN 20 MG/G
OINTMENT TOPICAL 2 TIMES DAILY
Status: DISCONTINUED | OUTPATIENT
Start: 2023-12-03 | End: 2023-12-03

## 2023-12-03 RX ORDER — IBUPROFEN 200 MG
16 TABLET ORAL
Status: DISCONTINUED | OUTPATIENT
Start: 2023-12-04 | End: 2023-12-06 | Stop reason: HOSPADM

## 2023-12-03 RX ORDER — ALENDRONATE SODIUM 70 MG/1
70 TABLET ORAL
COMMUNITY
Start: 2023-11-14

## 2023-12-03 RX ORDER — ONDANSETRON 2 MG/ML
4 INJECTION INTRAMUSCULAR; INTRAVENOUS EVERY 6 HOURS PRN
Status: DISCONTINUED | OUTPATIENT
Start: 2023-12-03 | End: 2023-12-06 | Stop reason: HOSPADM

## 2023-12-03 RX ORDER — PALIPERIDONE PALMITATE 234 MG/1.5ML
234 INJECTION INTRAMUSCULAR
COMMUNITY
Start: 2023-11-24

## 2023-12-03 RX ORDER — OLANZAPINE 20 MG/1
20 TABLET ORAL NIGHTLY
COMMUNITY
Start: 2023-11-12

## 2023-12-03 RX ORDER — INSULIN ASPART 100 [IU]/ML
0-5 INJECTION, SOLUTION INTRAVENOUS; SUBCUTANEOUS
Status: DISCONTINUED | OUTPATIENT
Start: 2023-12-04 | End: 2023-12-06 | Stop reason: HOSPADM

## 2023-12-03 RX ORDER — METFORMIN HYDROCHLORIDE 500 MG/1
500 TABLET, EXTENDED RELEASE ORAL NIGHTLY
Status: ON HOLD | COMMUNITY
Start: 2023-11-14 | End: 2023-12-05 | Stop reason: HOSPADM

## 2023-12-03 RX ORDER — ONDANSETRON 2 MG/ML
4 INJECTION INTRAMUSCULAR; INTRAVENOUS
Status: DISCONTINUED | OUTPATIENT
Start: 2023-12-03 | End: 2023-12-03

## 2023-12-03 RX ORDER — SODIUM CHLORIDE 9 MG/ML
125 INJECTION, SOLUTION INTRAVENOUS CONTINUOUS
Status: DISCONTINUED | OUTPATIENT
Start: 2023-12-04 | End: 2023-12-03

## 2023-12-03 RX ORDER — IBUPROFEN 200 MG
24 TABLET ORAL
Status: DISCONTINUED | OUTPATIENT
Start: 2023-12-04 | End: 2023-12-06 | Stop reason: HOSPADM

## 2023-12-03 RX ORDER — POTASSIUM CHLORIDE 7.45 MG/ML
10 INJECTION INTRAVENOUS
Status: DISCONTINUED | OUTPATIENT
Start: 2023-12-03 | End: 2023-12-03

## 2023-12-03 RX ORDER — ATORVASTATIN CALCIUM 40 MG/1
40 TABLET, FILM COATED ORAL DAILY
Status: DISCONTINUED | OUTPATIENT
Start: 2023-12-04 | End: 2023-12-06 | Stop reason: HOSPADM

## 2023-12-03 RX ORDER — DEXTROSE MONOHYDRATE 100 MG/ML
INJECTION, SOLUTION INTRAVENOUS
Status: COMPLETED | OUTPATIENT
Start: 2023-12-03 | End: 2023-12-03

## 2023-12-03 RX ADMIN — DEXTROSE AND SODIUM CHLORIDE: 5; 450 INJECTION, SOLUTION INTRAVENOUS at 10:12

## 2023-12-03 RX ADMIN — SODIUM CHLORIDE 1000 ML: 9 INJECTION, SOLUTION INTRAVENOUS at 06:12

## 2023-12-03 RX ADMIN — ONDANSETRON 4 MG: 2 INJECTION INTRAMUSCULAR; INTRAVENOUS at 06:12

## 2023-12-03 RX ADMIN — DEXTROSE AND SODIUM CHLORIDE 125 ML/HR: 5; 450 INJECTION, SOLUTION INTRAVENOUS at 11:12

## 2023-12-03 RX ADMIN — DEXTROSE: 10 SOLUTION INTRAVENOUS at 06:12

## 2023-12-03 RX ADMIN — SODIUM CHLORIDE 1000 ML: 9 INJECTION, SOLUTION INTRAVENOUS at 04:12

## 2023-12-03 RX ADMIN — SODIUM CHLORIDE 1000 ML: 9 INJECTION, SOLUTION INTRAVENOUS at 05:12

## 2023-12-03 RX ADMIN — POTASSIUM BICARBONATE 50 MEQ: 978 TABLET, EFFERVESCENT ORAL at 06:12

## 2023-12-03 RX ADMIN — SODIUM CHLORIDE 0.1 UNITS/KG/HR: 9 INJECTION, SOLUTION INTRAVENOUS at 06:12

## 2023-12-03 RX ADMIN — SODIUM CHLORIDE 0.05 UNITS/KG/HR: 9 INJECTION, SOLUTION INTRAVENOUS at 11:12

## 2023-12-03 NOTE — ED NOTES
Patient presents to ED reports abd pain with vomiting and diarrhea this morning. Patient reports x 4 episodes of diarrhea this am, multiple vomiting episodes. Patient reports LUQ/ epigastric pain. Patient states pain has been intermittent. Patient reports hx of DM, did not take medications yesterday but did take today. Patient reports sometimes forgets to take medications.

## 2023-12-03 NOTE — FIRST PROVIDER EVALUATION
" Emergency Department TeleTriage Encounter Note      CHIEF COMPLAINT    Chief Complaint   Patient presents with    Abdominal Pain     PT presents to the ED with C/O mid epigastric pain x this AM. N/V/D. +fevers/chills. Reports glucose is 380 this AM. Afebrile in triage. VSS.        VITAL SIGNS   Initial Vitals [12/03/23 1401]   BP Pulse Resp Temp SpO2   (!) 146/83 85 20 98 °F (36.7 °C) 97 %      MAP       --            ALLERGIES    Review of patient's allergies indicates:   Allergen Reactions    Naproxen Other (See Comments)     PATIENT EXPRESSED " THIS MEDICATION MAKES MY WHOLE BODY FEEL JITTERY."    Tramadol Other (See Comments)     PATIENT EXPRESSED " THIS MEDICATION MAKES MY WHOLE BODY FEEL JITTERY."       PROVIDER TRIAGE NOTE  This is a teletriage evaluation of a 58 y.o. female presenting to the ED complaining of abd pain with n/v/d starting today. Reports upper abd pain. States that her glucose has been high "for a while."      Alert, speaking in complete sentences. No distress.     Initial orders will be placed and care will be transferred to an alternate provider when patient is roomed for a full evaluation. Any additional orders and the final disposition will be determined by that provider.         ORDERS  Labs Reviewed - No data to display    ED Orders (720h ago, onward)      Start Ordered     Status Ordering Provider    12/03/23 1415 12/03/23 1410  sodium chloride 0.9% bolus 1,000 mL 1,000 mL  Once         Ordered CLIFFORD MARTINEZ    12/03/23 1415 12/03/23 1410  ondansetron injection 4 mg  ED 1 Time         Ordered CLIFFORD MARTINEZ    12/03/23 1411 12/03/23 1410  Diet NPO  Diet effective now         Ordered CLIFFORD MARTINEZ    Unscheduled 12/03/23 1410  Vital signs  Every 2 hours         Ordered CLIFFORD MARTINEZ    Unscheduled 12/03/23 1410  Insert peripheral IV  Once         Ordered CLIFFORD MARTINEZ    Unscheduled 12/03/23 1410  CBC W/ AUTO DIFFERENTIAL  STAT  "        Ordered SCHOTTELVENTURATTE, CLIFFORD N.    Unscheduled 12/03/23 1410  Comp. Metabolic Panel  STAT         Ordered SCHOTTELVENTURATTEMYACLIFFORD N.    Unscheduled 12/03/23 1410  Lipase  STAT         Ordered SCHOTTELKOTTE, CLIFFORD N.    Unscheduled 12/03/23 1410  Urinalysis, Reflex to Urine Culture Urine, Clean Catch  STAT         Ordered MYA MARTINEZSSIKA N.    Unscheduled 12/03/23 1410  Beta - Hydroxybutyrate, Serum  Once         Ordered CLIFFORD MARTINEZ N.              Virtual Visit Note: The provider triage portion of this emergency department evaluation and documentation was performed via [employer], a HIPAA-compliant telemedicine application, in concert with a tele-presenter in the room. A face to face patient evaluation with one of my colleagues will occur once the patient is placed in an emergency department room.      DISCLAIMER: This note was prepared with [email or web address] voice recognition transcription software. Garbled syntax, mangled pronouns, and other bizarre constructions may be attributed to that software system.

## 2023-12-03 NOTE — Clinical Note
Diagnosis: DKA (diabetic ketoacidosis) [618138]   Future Attending Provider: LANI MCKOY [[id number]]   Admitting Provider:: CRYSTAL ACEVEDO [[id number]]   Requested Bed Type: Incubator [1002]   Reason for IP Medical Treatment  (Clinical interventions that can only be accomplished in the IP setting? ) :: insulin drip. IV fluids   I certify that Inpatient services for greater than or equal to 2 midnights are medically necessary:: Yes   Plans for Post-Acute care--if anticipated (pick the single best option):: C. Discharge home with home health services

## 2023-12-03 NOTE — ED PROVIDER NOTES
"Encounter Date: 12/3/2023       History     Chief Complaint   Patient presents with    Abdominal Pain     PT presents to the ED with C/O mid epigastric pain x this AM. N/V/D. +fevers/chills. Reports glucose is 380 this AM. Afebrile in triage. VSS.      Katelynn Castro is a 58 y.o. female  has a past medical history of Arthritis, Diabetes mellitus, Glaucoma, Hyperlipidemia, Hypertension, Mixed incontinence, Neuropathy, Schizophrenia, schizo-affective, and Sleep apnea. presenting to the Emergency Department for nausea, vomiting, and diarrhea since early this morning.  Patient reports 4 episodes of vomiting and 4 episodes of diarrhea.  Patient reports taking her glucose this morning and it was 308.  Reports chills.  No fever.  No cough, shortness of breath, chest pain.  Reports upper abdominal pain.        The history is provided by the patient.     Review of patient's allergies indicates:   Allergen Reactions    Naproxen Other (See Comments)     PATIENT EXPRESSED " THIS MEDICATION MAKES MY WHOLE BODY FEEL JITTERY."    Tramadol Other (See Comments)     PATIENT EXPRESSED " THIS MEDICATION MAKES MY WHOLE BODY FEEL JITTERY."     Past Medical History:   Diagnosis Date    Arthritis     Diabetes mellitus     Glaucoma     Hyperlipidemia     Hypertension     Mixed incontinence     Neuropathy     Schizophrenia, schizo-affective     Sleep apnea      Past Surgical History:   Procedure Laterality Date     SECTION      x1    CYSTOSCOPY  2018    Procedure: CYSTOSCOPY;  Surgeon: Bebeto Chaves II, MD;  Location: [facility];  Service: Urology;;    GALL STONES      HERNIA REPAIR      HYSTERECTOMY      INSERTION OF MIDURETHRAL SLING N/A 2018    Procedure: SLING-MID URETHRAL;  Surgeon: Bebeto Chaves II, MD;  Location: [facility];  Service: Urology;  Laterality: N/A;  Site: midurethral     Family History   Problem Relation Age of Onset    Diabetes Father     Cancer Father     Diabetes Mother      Social History "     Tobacco Use    Smoking status: Never    Smokeless tobacco: Never   Substance Use Topics    Alcohol use: Yes     Comment: beer-2-3/day    Drug use: Yes     Types: Marijuana     Review of Systems   Constitutional:  Positive for chills. Negative for fever.   HENT:  Negative for sore throat.    Respiratory:  Negative for shortness of breath.    Cardiovascular:  Negative for chest pain.   Gastrointestinal:  Positive for abdominal pain, diarrhea, nausea and vomiting.   Genitourinary:  Negative for dysuria.   Musculoskeletal:  Negative for back pain.   Skin:  Negative for rash.   Neurological:  Negative for weakness.   Hematological:  Does not bruise/bleed easily.   All other systems reviewed and are negative.      Physical Exam     Initial Vitals [12/03/23 1401]   BP Pulse Resp Temp SpO2   (!) 146/83 85 20 98 °F (36.7 °C) 97 %      MAP       --         Physical Exam    Nursing note and vitals reviewed.  Constitutional: She appears well-developed and well-nourished.   HENT:   Head: Normocephalic.   Right Ear: Hearing, tympanic membrane and external ear normal.   Left Ear: Hearing, tympanic membrane and external ear normal.   Nose: Nose normal.   Mouth/Throat: Oropharynx is clear and moist. Mucous membranes are dry.   Eyes: Conjunctivae and lids are normal. Pupils are equal, round, and reactive to light.   Neck:   Normal range of motion.  Cardiovascular:  Normal rate, regular rhythm and normal heart sounds.           Pulmonary/Chest: Breath sounds normal. No respiratory distress. She has no wheezes. She has no rhonchi.   Abdominal: Abdomen is soft. Bowel sounds are normal. There is abdominal tenderness in the epigastric area. There is no rebound and no guarding.   Musculoskeletal:         General: Normal range of motion.      Cervical back: Normal range of motion. No rigidity.     Neurological: She is alert and oriented to person, place, and time.   Skin: Skin is warm and dry. No rash noted.   Psychiatric: She has a  normal mood and affect. Her behavior is normal. Judgment and thought content normal.         ED Course   Critical Care    Date/Time: 12/3/2023 10:17 PM    Performed by: Cadence Schwarz PA-C  Authorized by: Thiago Loya MD  Total critical care time (exclusive of procedural time) : 60 minutes  Critical care time was exclusive of separately billable procedures and treating other patients.  Critical care was necessary to treat or prevent imminent or life-threatening deterioration of the following conditions: metabolic crisis.  Critical care was time spent personally by me on the following activities: development of treatment plan with patient or surrogate, discussions with consultants, discussions with primary provider, evaluation of patient's response to treatment, examination of patient, obtaining history from patient or surrogate, ordering and performing treatments and interventions, ordering and review of laboratory studies, ordering and review of radiographic studies, re-evaluation of patient's condition and review of old charts.        Labs Reviewed   CBC W/ AUTO DIFFERENTIAL - Abnormal; Notable for the following components:       Result Value    WBC 16.93 (*)     MCH 32.6 (*)     Immature Granulocytes 1.3 (*)     Gran # (ANC) 13.9 (*)     Immature Grans (Abs) 0.22 (*)     Gran % 81.9 (*)     Lymph % 12.3 (*)     All other components within normal limits   COMPREHENSIVE METABOLIC PANEL - Abnormal; Notable for the following components:    CO2 7 (*)     Glucose 352 (*)     Total Protein 10.6 (*)     Albumin 5.4 (*)     Alkaline Phosphatase 140 (*)     ALT 52 (*)     Anion Gap 30 (*)     All other components within normal limits    Narrative:      CO2  critical result(s) called and verbal readback obtained from   Catarina Kline RN by DICKSON 12/03/2023 17:12   URINALYSIS, REFLEX TO URINE CULTURE - Abnormal; Notable for the following components:    Protein, UA 1+ (*)     Glucose, UA 3+ (*)     Ketones, UA 3+ (*)      Occult Blood UA 1+ (*)     All other components within normal limits    Narrative:     Preferred Collection Type->Urine, Clean Catch  Specimen Source->Urine   BETA - HYDROXYBUTYRATE, SERUM - Abnormal; Notable for the following components:    Beta-Hydroxybutyrate 5.5 (*)     All other components within normal limits   URINALYSIS MICROSCOPIC - Abnormal; Notable for the following components:    RBC, UA 5 (*)     Bacteria Few (*)     All other components within normal limits    Narrative:     Preferred Collection Type->Urine, Clean Catch  Specimen Source->Urine   BASIC METABOLIC PANEL - Abnormal; Notable for the following components:    Chloride 112 (*)     CO2 13 (*)     Glucose 202 (*)     Calcium 8.5 (*)     All other components within normal limits   POCT GLUCOSE - Abnormal; Notable for the following components:    POCT Glucose 307 (*)     All other components within normal limits   ISTAT PROCEDURE - Abnormal; Notable for the following components:    POC PH 7.265 (*)     POC PCO2 31.0 (*)     POC HCO3 14.1 (*)     POC BE -13 (*)     POC TCO2 15 (*)     All other components within normal limits   POCT GLUCOSE - Abnormal; Notable for the following components:    POCT Glucose 225 (*)     All other components within normal limits   POCT GLUCOSE - Abnormal; Notable for the following components:    POCT Glucose 214 (*)     All other components within normal limits   POCT GLUCOSE - Abnormal; Notable for the following components:    POCT Glucose 201 (*)     All other components within normal limits   POCT GLUCOSE - Abnormal; Notable for the following components:    POCT Glucose 195 (*)     All other components within normal limits   INFLUENZA A & B BY MOLECULAR   LIPASE   SARS-COV-2 RNA AMPLIFICATION, QUAL    Narrative:     Is the patient symptomatic?->Yes   POCT GLUCOSE MONITORING CONTINUOUS   POCT GLUCOSE MONITORING CONTINUOUS          Imaging Results    None          Medications   ondansetron injection 4 mg (4 mg  Intravenous Not Given 12/3/23 1415)   sodium chloride 0.9% flush 10 mL (has no administration in time range)   0.9%  NaCl infusion (0 mLs Intravenous Stopped 12/3/23 2115)   dextrose 5 % and 0.45 % NaCl infusion (has no administration in time range)   dextrose 10 % infusion (has no administration in time range)   dextrose 10 % infusion (has no administration in time range)   insulin regular 1 Units/mL in sodium chloride 0.9% 100 mL infusion (0.05 Units/kg/hr × 72.6 kg Intravenous Handoff 12/3/23 2228)   potassium chloride 10 mEq in 100 mL IVPB (has no administration in time range)     And   potassium chloride 10 mEq in 100 mL IVPB (has no administration in time range)     And   potassium chloride 10 mEq in 100 mL IVPB (has no administration in time range)   mupirocin 2 % ointment (has no administration in time range)   sodium chloride 0.9% bolus 1,000 mL 1,000 mL (0 mLs Intravenous Stopped 12/3/23 1707)   sodium chloride 0.9% bolus 1,000 mL 1,000 mL (0 mLs Intravenous Stopped 12/3/23 1803)   potassium bicarbonate disintegrating tablet 50 mEq (50 mEq Oral Given 12/3/23 1825)   dextrose 10 % infusion ( Intravenous New Bag 12/3/23 1816)   ondansetron injection 4 mg (4 mg Intravenous Given 12/3/23 1819)     Medical Decision Making  This is an emergent evaluation of 58 y.o. female in the ED presenting for nausea, vomiting, and diarrhea. Physical exam reveals a concerning, afebrile, and well-appearing female in no apparent respiratory distress. Pertinent physical exam findings above.  Patient is not tachypneic on initial evaluation.  We will place order to evaluate patient for possible DKA.  Patient hypertensive. If available, previous records reviewed.    My overall impression is euglycemic DKA. Differential Diagnoses: acute constipation, early appendicitis, colitis, diverticulitis, volvulus, small bowel obstruction, pancreatitis, mesenteric ischemia, gastroenteritis, peritonititis, AAA, large bowel obstruction/volvulus,  IBS, DKA, hernia, kidney stone     This case was discussed with and the patient was independently examined by my attending, Dr. Loya who is in agreement with my assessment and plan.    All available findings were reviewed with the patient/family in detail along with the indications for hospitalization in order to receive cardiac monitoring, IVF, insulin drip, and DKA management.  All remaining questions and concerns were addressed at this time and the patient/family communicates understanding and agrees to proceed accordingly.  Similarly, all pertinent details of the encounter were discussed with Christian Toro who agrees to receive the patient at [facility] for further care as outlined above.  The patient will be transferred by EMS secondary to a need for ongoing cardiac monitoring, insulin drip, and IVF en route.      Amount and/or Complexity of Data Reviewed  Labs: ordered. Decision-making details documented in ED Course.    Risk  OTC drugs.  Prescription drug management.  Decision regarding hospitalization.  Diagnosis or treatment significantly limited by social determinants of health.               ED Course as of 12/03/23 2233   Sun Dec 03, 2023   1616 POCT Glucose(!): 307 [LH]   1700 Influenza and COVID negative. [LH]   1730 Beta - Hydroxybutyrate, Serum(!)  5.5.  Elevated [LH]   1734 [facility] IM paged [LH]   1734 WBC(!): 16.93 [LH]   1734 CO2(!!): 7 [LH]   1734 Glucose(!): 352 [LH]   1734 Glucose, UA(!): 3+ [LH]   1734 Ketones, UA(!): 3+ [LH]   1734 POC PH(!!): 7.265  Advised Dr. Loya of pt's labwork results. DKA treatment orders placed at this time with Dr. Loya's assistance.  Insulin drip with supplemental potassium and dextrose. [LH]   1734 POC HCO3(!): 14.1 [LH]   1734 Comp. Metabolic Panel(!!)  Anion gap of 30.  Bicarb is 7.  Evidence of DKA. [LH]   1740 Urinalysis, Reflex to Urine Culture Urine, Clean Catch(!)  Increased glucose and ketones in urine consistent with jardiance use and DKA. [LH]   1804  [facility] IM team (Christian Toro) contacted by secure chat. Informed of history, physical exam, lab work showing findings consistent with diabetic ketoacidosis.  Patient has been accepted for inpatient treatment/ICU.  Orders will be placed. [LH]   2043 Basic metabolic panel(!)  Anion gap has decreased to 16.  Bicarb 13. [LH]   2112 POCT Glucose(!): 195 [LH]      ED Course User Index  [LH] Cadence Schwarz PA-C                           Clinical Impression:  Final diagnoses:  [E11.10] DKA (diabetic ketoacidosis)          ED Disposition Condition    Admit Stable                Cadence Schwarz PA-C  12/03/23 8946

## 2023-12-04 PROBLEM — I10 HYPERTENSION: Status: ACTIVE | Noted: 2023-12-04

## 2023-12-04 PROBLEM — F20.9 SCHIZOPHRENIA: Status: ACTIVE | Noted: 2023-12-04

## 2023-12-04 PROBLEM — M81.0 OSTEOPOROSIS: Status: ACTIVE | Noted: 2023-12-04

## 2023-12-04 PROBLEM — R05.1 ACUTE COUGH: Status: ACTIVE | Noted: 2023-12-04

## 2023-12-04 LAB
ALBUMIN SERPL BCP-MCNC: 4 G/DL (ref 3.5–5.2)
ALP SERPL-CCNC: 101 U/L (ref 55–135)
ALT SERPL W/O P-5'-P-CCNC: 36 U/L (ref 10–44)
ANION GAP SERPL CALC-SCNC: 13 MMOL/L (ref 8–16)
ANION GAP SERPL CALC-SCNC: 14 MMOL/L (ref 8–16)
ANION GAP SERPL CALC-SCNC: 14 MMOL/L (ref 8–16)
AST SERPL-CCNC: 19 U/L (ref 10–40)
BASOPHILS # BLD AUTO: 0.05 K/UL (ref 0–0.2)
BASOPHILS NFR BLD: 0.3 % (ref 0–1.9)
BILIRUB SERPL-MCNC: 0.6 MG/DL (ref 0.1–1)
BUN SERPL-MCNC: 10 MG/DL (ref 6–20)
BUN SERPL-MCNC: 14 MG/DL (ref 6–20)
BUN SERPL-MCNC: 16 MG/DL (ref 6–20)
CALCIUM SERPL-MCNC: 8.5 MG/DL (ref 8.7–10.5)
CALCIUM SERPL-MCNC: 8.9 MG/DL (ref 8.7–10.5)
CALCIUM SERPL-MCNC: 9 MG/DL (ref 8.7–10.5)
CHLORIDE SERPL-SCNC: 103 MMOL/L (ref 95–110)
CHLORIDE SERPL-SCNC: 104 MMOL/L (ref 95–110)
CHLORIDE SERPL-SCNC: 106 MMOL/L (ref 95–110)
CO2 SERPL-SCNC: 17 MMOL/L (ref 23–29)
CO2 SERPL-SCNC: 19 MMOL/L (ref 23–29)
CO2 SERPL-SCNC: 20 MMOL/L (ref 23–29)
CREAT SERPL-MCNC: 0.9 MG/DL (ref 0.5–1.4)
CREAT SERPL-MCNC: 0.9 MG/DL (ref 0.5–1.4)
CREAT SERPL-MCNC: 1 MG/DL (ref 0.5–1.4)
DIFFERENTIAL METHOD: ABNORMAL
EOSINOPHIL # BLD AUTO: 0 K/UL (ref 0–0.5)
EOSINOPHIL NFR BLD: 0.1 % (ref 0–8)
ERYTHROCYTE [DISTWIDTH] IN BLOOD BY AUTOMATED COUNT: 13.3 % (ref 11.5–14.5)
EST. GFR  (NO RACE VARIABLE): >60 ML/MIN/1.73 M^2
GLUCOSE SERPL-MCNC: 153 MG/DL (ref 70–110)
GLUCOSE SERPL-MCNC: 157 MG/DL (ref 70–110)
GLUCOSE SERPL-MCNC: 221 MG/DL (ref 70–110)
HCT VFR BLD AUTO: 36.9 % (ref 37–48.5)
HGB BLD-MCNC: 13.1 G/DL (ref 12–16)
IMM GRANULOCYTES # BLD AUTO: 0.11 K/UL (ref 0–0.04)
IMM GRANULOCYTES NFR BLD AUTO: 0.6 % (ref 0–0.5)
LYMPHOCYTES # BLD AUTO: 3.4 K/UL (ref 1–4.8)
LYMPHOCYTES NFR BLD: 19.4 % (ref 18–48)
MAGNESIUM SERPL-MCNC: 2.2 MG/DL (ref 1.6–2.6)
MCH RBC QN AUTO: 32.4 PG (ref 27–31)
MCHC RBC AUTO-ENTMCNC: 35.5 G/DL (ref 32–36)
MCV RBC AUTO: 91 FL (ref 82–98)
MONOCYTES # BLD AUTO: 1.7 K/UL (ref 0.3–1)
MONOCYTES NFR BLD: 9.8 % (ref 4–15)
NEUTROPHILS # BLD AUTO: 12.2 K/UL (ref 1.8–7.7)
NEUTROPHILS NFR BLD: 69.8 % (ref 38–73)
NRBC BLD-RTO: 0 /100 WBC
PHOSPHATE SERPL-MCNC: 1.8 MG/DL (ref 2.7–4.5)
PLATELET # BLD AUTO: 209 K/UL (ref 150–450)
PMV BLD AUTO: 9.9 FL (ref 9.2–12.9)
POCT GLUCOSE: 142 MG/DL (ref 70–110)
POCT GLUCOSE: 143 MG/DL (ref 70–110)
POCT GLUCOSE: 148 MG/DL (ref 70–110)
POCT GLUCOSE: 155 MG/DL (ref 70–110)
POCT GLUCOSE: 161 MG/DL (ref 70–110)
POCT GLUCOSE: 179 MG/DL (ref 70–110)
POCT GLUCOSE: 203 MG/DL (ref 70–110)
POTASSIUM SERPL-SCNC: 3.7 MMOL/L (ref 3.5–5.1)
POTASSIUM SERPL-SCNC: 3.8 MMOL/L (ref 3.5–5.1)
POTASSIUM SERPL-SCNC: 4 MMOL/L (ref 3.5–5.1)
PROT SERPL-MCNC: 7.9 G/DL (ref 6–8.4)
RBC # BLD AUTO: 4.04 M/UL (ref 4–5.4)
SODIUM SERPL-SCNC: 135 MMOL/L (ref 136–145)
SODIUM SERPL-SCNC: 136 MMOL/L (ref 136–145)
SODIUM SERPL-SCNC: 139 MMOL/L (ref 136–145)
TROPONIN I SERPL DL<=0.01 NG/ML-MCNC: 0.01 NG/ML (ref 0–0.03)
TROPONIN I SERPL DL<=0.01 NG/ML-MCNC: 0.11 NG/ML (ref 0–0.03)
WBC # BLD AUTO: 17.52 K/UL (ref 3.9–12.7)

## 2023-12-04 PROCEDURE — 25000003 PHARM REV CODE 250

## 2023-12-04 PROCEDURE — 93010 ELECTROCARDIOGRAM REPORT: CPT | Mod: ,,, | Performed by: INTERNAL MEDICINE

## 2023-12-04 PROCEDURE — 87040 BLOOD CULTURE FOR BACTERIA: CPT | Performed by: STUDENT IN AN ORGANIZED HEALTH CARE EDUCATION/TRAINING PROGRAM

## 2023-12-04 PROCEDURE — 63600175 PHARM REV CODE 636 W HCPCS: Performed by: STUDENT IN AN ORGANIZED HEALTH CARE EDUCATION/TRAINING PROGRAM

## 2023-12-04 PROCEDURE — 93005 ELECTROCARDIOGRAM TRACING: CPT

## 2023-12-04 PROCEDURE — 84484 ASSAY OF TROPONIN QUANT: CPT

## 2023-12-04 PROCEDURE — 36415 COLL VENOUS BLD VENIPUNCTURE: CPT | Performed by: STUDENT IN AN ORGANIZED HEALTH CARE EDUCATION/TRAINING PROGRAM

## 2023-12-04 PROCEDURE — 85025 COMPLETE CBC W/AUTO DIFF WBC: CPT | Performed by: STUDENT IN AN ORGANIZED HEALTH CARE EDUCATION/TRAINING PROGRAM

## 2023-12-04 PROCEDURE — 80048 BASIC METABOLIC PNL TOTAL CA: CPT | Mod: 91,XB

## 2023-12-04 PROCEDURE — 63600175 PHARM REV CODE 636 W HCPCS

## 2023-12-04 PROCEDURE — 83735 ASSAY OF MAGNESIUM: CPT

## 2023-12-04 PROCEDURE — 93010 EKG 12-LEAD: ICD-10-PCS | Mod: ,,, | Performed by: INTERNAL MEDICINE

## 2023-12-04 PROCEDURE — 80053 COMPREHEN METABOLIC PANEL: CPT | Performed by: STUDENT IN AN ORGANIZED HEALTH CARE EDUCATION/TRAINING PROGRAM

## 2023-12-04 PROCEDURE — 36415 COLL VENOUS BLD VENIPUNCTURE: CPT

## 2023-12-04 PROCEDURE — 11000001 HC ACUTE MED/SURG PRIVATE ROOM

## 2023-12-04 PROCEDURE — 94761 N-INVAS EAR/PLS OXIMETRY MLT: CPT

## 2023-12-04 PROCEDURE — 25000003 PHARM REV CODE 250: Performed by: STUDENT IN AN ORGANIZED HEALTH CARE EDUCATION/TRAINING PROGRAM

## 2023-12-04 PROCEDURE — 84100 ASSAY OF PHOSPHORUS: CPT | Performed by: STUDENT IN AN ORGANIZED HEALTH CARE EDUCATION/TRAINING PROGRAM

## 2023-12-04 RX ORDER — BENZONATATE 100 MG/1
100 CAPSULE ORAL 3 TIMES DAILY PRN
Status: DISCONTINUED | OUTPATIENT
Start: 2023-12-04 | End: 2023-12-06 | Stop reason: HOSPADM

## 2023-12-04 RX ORDER — SODIUM CHLORIDE, SODIUM LACTATE, POTASSIUM CHLORIDE, CALCIUM CHLORIDE 600; 310; 30; 20 MG/100ML; MG/100ML; MG/100ML; MG/100ML
INJECTION, SOLUTION INTRAVENOUS CONTINUOUS
Status: ACTIVE | OUTPATIENT
Start: 2023-12-04 | End: 2023-12-05

## 2023-12-04 RX ORDER — PANTOPRAZOLE SODIUM 40 MG/1
40 TABLET, DELAYED RELEASE ORAL DAILY
Status: DISCONTINUED | OUTPATIENT
Start: 2023-12-04 | End: 2023-12-04

## 2023-12-04 RX ORDER — CETIRIZINE HYDROCHLORIDE 5 MG/1
5 TABLET ORAL DAILY
Status: DISCONTINUED | OUTPATIENT
Start: 2023-12-04 | End: 2023-12-06 | Stop reason: HOSPADM

## 2023-12-04 RX ORDER — OLANZAPINE 5 MG/1
20 TABLET, ORALLY DISINTEGRATING ORAL NIGHTLY
Status: DISCONTINUED | OUTPATIENT
Start: 2023-12-04 | End: 2023-12-06 | Stop reason: HOSPADM

## 2023-12-04 RX ADMIN — LOSARTAN POTASSIUM 50 MG: 50 TABLET, FILM COATED ORAL at 09:12

## 2023-12-04 RX ADMIN — OLANZAPINE 20 MG: 5 TABLET, ORALLY DISINTEGRATING ORAL at 09:12

## 2023-12-04 RX ADMIN — ENOXAPARIN SODIUM 40 MG: 40 INJECTION SUBCUTANEOUS at 04:12

## 2023-12-04 RX ADMIN — TRAZODONE HYDROCHLORIDE 150 MG: 100 TABLET ORAL at 09:12

## 2023-12-04 RX ADMIN — INSULIN DETEMIR 10 UNITS: 100 INJECTION, SOLUTION SUBCUTANEOUS at 09:12

## 2023-12-04 RX ADMIN — BENZONATATE 100 MG: 100 CAPSULE ORAL at 09:12

## 2023-12-04 RX ADMIN — ATORVASTATIN CALCIUM 40 MG: 40 TABLET, FILM COATED ORAL at 09:12

## 2023-12-04 RX ADMIN — CETIRIZINE HYDROCHLORIDE 5 MG: 5 TABLET, FILM COATED ORAL at 12:12

## 2023-12-04 RX ADMIN — INSULIN DETEMIR 5 UNITS: 100 INJECTION, SOLUTION SUBCUTANEOUS at 12:12

## 2023-12-04 RX ADMIN — INSULIN ASPART 1 UNITS: 100 INJECTION, SOLUTION INTRAVENOUS; SUBCUTANEOUS at 09:12

## 2023-12-04 RX ADMIN — SODIUM CHLORIDE, POTASSIUM CHLORIDE, SODIUM LACTATE AND CALCIUM CHLORIDE: 600; 310; 30; 20 INJECTION, SOLUTION INTRAVENOUS at 04:12

## 2023-12-04 NOTE — CONSULTS
Patient in with DKA.  States she was diagnosed with Diabetes a few years ago.  She is only on Jardiance.  States she took Metformin before but had major diarrhea and was dc'd.  She has a glucometer and knows how to do blood sugar checks.  She states she used to be on insulin a few years ago and knows how to use the insulin pens and do self injections.  Verbal/written education (Diabetes Management Guide) done with patient.  Education included; what is diabetes, what an A1C level is, A1C goal of <7.0%, current A1C is 8.6%, glucose monitoring and should monitor 4 times/day (with meals/bedtime), hyper/hypoglycemia signs & symptoms/treatment, carbohydrate counting diet which includes how to read a nutrition label, phone apps to see how many carbs are in something, etc., portion size, counting 45-60 grams of carbs per meal and 15-30 grams of carbs for snacks (1-3 per day if wanted)  including physical activity 5 days/week, long-term complications of uncontrolled diabetes, follow up with doctor every 3 months for diabetes management, daily foot care, and yearly eye exams.  Medication education included Metformin and insulin (short & long-acting).  Recommend follow up with PCP and medications delivered to bedside prior to discharge.  Phone number provided to patient if he has any questions.

## 2023-12-04 NOTE — NURSING
Patient arrived to unit from ICU. Vitals obtained and assessment performed. MD notified of patient's arrival to unit. VN also notified to complete admit assessment.

## 2023-12-04 NOTE — H&P
"[facility] Medicine H&P Note     Admitting Team: [facility] Hospitalist Team A  Attending Physician: Jose Echevarria MD  Resident: Ruben  Intern: Dagmar Guzmán     Date of Admit: 12/3/2023    Chief Complaint     Abdominal pain, N/V x1 day    Subjective:      History of Present Illness:  Katelynn Castro is a 58 y.o. female with PMHx of T2DM, HTN, HLD, schizophrenia and mixed incontinence who presented to [facility] ED on 12/3/23 with chief complaint of abdominal pain, nausea, vomiting and diarrhea x1 day.     She woke up early the AM of presentation with epigastric abdominal pain and proceeded to have multiple episodes of NBNB emesis and watery, non-bloody diarrhea. POCT glucose this AM was 308. Endorses subjective fever/chills. No known sick contacts, did not eat anything out of the ordinary. Denies cough, CP, SOB. Denies ever being in DKA before. Endorses adherence to medications most of the time but does admit to some forgetfulness.       Past Medical History:  Past Medical History:   Diagnosis Date    Arthritis     Diabetes mellitus     Glaucoma     Hyperlipidemia     Hypertension     Mixed incontinence     Neuropathy     Schizophrenia, schizo-affective     Sleep apnea        Past Surgical History:  Past Surgical History:   Procedure Laterality Date     SECTION      x1    CYSTOSCOPY  2018    Procedure: CYSTOSCOPY;  Surgeon: Bebeto Chaves II, MD;  Location: [facility];  Service: Urology;;    GALL STONES      HERNIA REPAIR      HYSTERECTOMY      INSERTION OF MIDURETHRAL SLING N/A 2018    Procedure: SLING-MID URETHRAL;  Surgeon: Bebeto Chaves II, MD;  Location: [facility];  Service: Urology;  Laterality: N/A;  Site: midurethral       Allergies:  Review of patient's allergies indicates:   Allergen Reactions    Naproxen Other (See Comments)     PATIENT EXPRESSED " THIS MEDICATION MAKES MY WHOLE BODY FEEL JITTERY."    Tramadol Other (See Comments)     PATIENT EXPRESSED " THIS MEDICATION MAKES MY WHOLE BODY " "FEEL JITTERY."       Home Medications:  Alendronate 70 mg q 7 days  Invega 234 mg q 28 days  Jardiance 10 mg daily  Zyprexa 20 mg qhs  Trazodone 150 mg qhs    Denies taking:   Metformin  mg q PM  Lipitor 10 mg QD  Losartan 100 mg QD  Oxybutynin 15 mg qd  Protonix 20 mg QD    Family History:  Family History   Problem Relation Age of Onset    Diabetes Father     Cancer Father     Diabetes Mother        Social History:  Social History     Tobacco Use    Smoking status: Never    Smokeless tobacco: Never   Substance Use Topics    Alcohol use: Yes     Comment: beer-2-3/day    Drug use: Yes     Types: Marijuana   Marijuana a couple times per week.  1 beer a couple times per week.    Review of Systems:  Pertinent items are noted in HPI. All other systems are reviewed and are negative.    Health Maintaince :   Primary Care Physician: Pauly Rolon ([facility])  Cardiology - ANNY Mclain MD  Pulmonology - DAVID Mary MD    Immunizations:   TDap unknown if UTD    Flu not UTD   Pna needs    Cancer Screening:  PAP: 2022 - positive high risk HPV test with negative colposcopy  MMG: last 2022  Colonoscopy: never     Objective:   Last 24 Hour Vital Signs:  BP  Min: 99/52  Max: 189/91  Temp  Av.4 °F (36.9 °C)  Min: 98 °F (36.7 °C)  Max: 99 °F (37.2 °C)  Pulse  Av.3  Min: 85  Max: 129  Resp  Av.9  Min: 18  Max: 24  SpO2  Av.6 %  Min: 94 %  Max: 100 %  Height  Av' (152.4 cm)  Min: 5' (152.4 cm)  Max: 5' (152.4 cm)  Weight  Av kg (161 lb 0.3 oz)  Min: 72.6 kg (160 lb)  Max: 73.5 kg (162 lb 0.6 oz)  Body mass index is 31.65 kg/m².  I/O last 3 completed shifts:  In:  [IV Piggyback:]  Out: -     Physical Examination:    /71   Pulse 109   Temp 98.3 °F (36.8 °C)   Resp 19   Ht 5' (1.524 m)   Wt 73.5 kg (162 lb 0.6 oz)   SpO2 97%   Breastfeeding No   BMI 31.65 kg/m²     General Appearance:    Alert, cooperative, no distress, appears stated age   Head:    Normocephalic, " "without obvious abnormality, atraumatic   Eyes:    PERRL, conjunctiva/corneas clear, EOM's intact,both eyes   Ears:    Normal external ear canals, both ears   Nose:   Nares normal, septum midline, mucosa normal, no drainage    or sinus tenderness   Throat:   Lips, mucosa, and tongue normal; MMM   Neck:   Supple, symmetrical, trachea midline,        Lungs:     Clear to auscultation bilaterally, respirations unlabored   Chest Wall:    No tenderness or deformity    Heart:    Regular rate and rhythm, S1 and S2 normal, no murmur, rub   or gallop       Abdomen:     Soft, mild epigastric TTP, bowel sounds active all four quadrants,     no masses, no organomegaly           Extremities:   Extremities normal, atraumatic, no cyanosis or edema   Pulses:   2+ and symmetric all extremities   Skin:   Skin color, texture, turgor normal, no rashes or lesions       Neurologic:   CNII-XII intact, normal strength, sensation and reflexes     Throughout. Somewhat sleepy but easily arousable         Laboratory:  Most Recent Data:  CBC:   Lab Results   Component Value Date    WBC 16.93 (H) 12/03/2023    HGB 15.6 12/03/2023    HCT 44 12/03/2023     12/03/2023    MCV 93 12/03/2023    RDW 12.9 12/03/2023       BMP:   Lab Results   Component Value Date     12/03/2023    K 4.5 12/03/2023     12/03/2023    CO2 17 (L) 12/03/2023    BUN 10 12/03/2023    CREATININE 0.9 12/03/2023     (H) 12/03/2023    CALCIUM 8.5 (L) 12/03/2023     LFTs:   Lab Results   Component Value Date    PROT 10.6 (H) 12/03/2023    ALBUMIN 5.4 (H) 12/03/2023    BILITOT 0.8 12/03/2023    AST 28 12/03/2023    ALKPHOS 140 (H) 12/03/2023    ALT 52 (H) 12/03/2023     Coags: No results found for: "INR", "PROTIME", "PTT"  FLP:   Lab Results   Component Value Date    CHOL 208 (H) 12/23/2022    HDL 61 12/23/2022    LDLCALC 133.6 12/23/2022    TRIG 67 12/23/2022    CHOLHDL 29.3 12/23/2022     DM:   Lab Results   Component Value Date    HGBA1C 8.6 (H) " "12/03/2023    HGBA1C 7.0 (H) 12/23/2022    HGBA1C 5.6 05/08/2018    LDLCALC 133.6 12/23/2022    CREATININE 0.9 12/03/2023     Thyroid:   Lab Results   Component Value Date    TSH 2.300 12/23/2022    FREET4 0.84 12/23/2022     Anemia: No results found for: "IRON", "TIBC", "FERRITIN", "[id number]", "FOLATE"  Cardiac: No results found for: "TROPONINI", "CKTOTAL", "CKMB", "BNP"  Urinalysis:   Lab Results   Component Value Date    LABURIN No growth 05/08/2018    COLORU Yellow 12/03/2023    SPECGRAV 1.020 12/03/2023    NITRITE Negative 12/03/2023    KETONESU 3+ (A) 12/03/2023    UROBILINOGEN Negative 12/03/2023    WBCUA 2 12/03/2023       Trended Lab Data:  Recent Labs   Lab 12/03/23  1603 12/03/23  1649 12/03/23  2043 12/03/23  2258   WBC 16.93*  --   --   --    HGB 15.6  --   --   --    HCT 44.2 44  --   --      --   --   --    MCV 93  --   --   --    RDW 12.9  --   --   --      --  141 137   K 5.0  --  4.5 4.5     --  112* 109   CO2 7*  --  13* 17*   BUN 15  --  11 10   CREATININE 0.95  --  0.61 0.9   *  --  202* 187*   PROT 10.6*  --   --   --    ALBUMIN 5.4*  --   --   --    BILITOT 0.8  --   --   --    AST 28  --   --   --    ALKPHOS 140*  --   --   --    ALT 52*  --   --   --          Trended Cardiac Data:  No results for input(s): "TROPONINI", "CKTOTAL", "CKMB", "BNP" in the last 168 hours.    Microbiology Data:  COVID/Flu negative    Other Results:  EKG (my interpretation): none    Radiology:  Imaging Results    None          Assessment:     Katelynn Castro is a 58 y.o. female with PMHx of T2DM, HTN, HLD, schizophrenia and mixed incontinence who presented to [facility] ED on 12/3/23 with chief complaint of abdominal pain, nausea, vomiting and diarrhea x1 day found to be in DKA. Admitted to [facility] for further treatment.     Plan:     #DKA in setting of T2DM  - on presentation VBG with pH 7.265, bicarb 7, AG 30, BHB 5.5,   - last A1c 7.0% 12/23/22, only on Jardiance at home  - " sx's c/w possible gastroenteritis trigger, COVID/flu neg, UA non-infectious  PLAN:  - continue IV insulin 1 unit/mL at 0.2 U/kg/h until blood glucose </= 200mg/dL  - POCT glucose q 1H  - IVF: NS at 125mL/hr, will d/c NS and start D5W when blood glucose <200  - repeat A1c pending; pt may require long-acting insulin vs. will likely need to restart 2nd agent on discharge  - CXR, blood cx pending    #Schizophrenia  - patient on Invega q 28 days (reports next dose is later this week)  - also endorses taking Zyprexa 20 mg and trazodone 150 mg qhs on med list  PLAN:  - given DKA and need for neurochecks + patient slightly sleepy and c/f interaction with Invega, will hold off on prescribing Zyprexa/Trazodone and attempt to verify with pharmacy tomorrow    #HTN  - pt denies taking medication at home  - BP ranged from 100s-190s/50s-90s in ED  PLAN:  - restart previously prescribed losartan at 50mg QD    #HLD  - denies taking home atorvastatin 10mg  - will start high-intensity statin    #Mixed incontinence  - s/p mid-urethral sling (2018)  - not currently taking home oxybutynin  - no acute issues      Code Status:     Full code    Jocelin Butterfield MD  [facility] Internal Medicine/Pediatrics HO-4    [facility] Medicine Hospitalist Pager numbers:   [facility] Hospitalist Medicine Team A (Wale/Adamaris): [phone]  [facility] Hospitalist Medicine Team B (Luis Manuel/Cathy):  [phone]

## 2023-12-04 NOTE — EICU
EICU BRIEF INITIAL EVALUATION:       HISTORY:      58-year-old woman admitted to ICU on insulin drip for DKA.    History of type 2 diabetes with neuropathy, arthritis, glaucoma, hyperlipidemia, hypertension, schizoaffective disorder, sleep apnea    DVT prophylaxis Lovenox, SCDs  GI prophylaxis not indicated    /84, P 118, RR 19, O2 sat 98   Resting comfortably on room air      CAMERA ASSESSMENT: Yes      TELEMETRY: Reviewed      NOTES: Reviewed     LABS: Reviewed      IMAGING: Personally reviewed.      DISCUSSED with bedside nurse        ASSESSMENT AND PLAN:       DKA-continue insulin drip until bicarb and anion gap normalized.  Aggressively hydrate.  Check electrolytes every 4 hours.  Replace electrolytes as needed      I have reviewed the plan of care for the patient and agree with the plan of care unless noted otherwise above.      MUSHTAQ Mahoney MD  eICU Attending  [phone]    This report has been created through the use of [employer] dictation software. Typographical and content errors may occur with this process. While efforts are made to detect and correct such errors, in some cases errors will persist. For this reason, wording in this document should be considered in the proper context and not strictly verbatim

## 2023-12-04 NOTE — PHARMACY MED REC
"  [facility]           Pharmacy  Admission Medication History     The home medication history was taken by Johnna Ballard.      Medication history obtained from Medications listed below were obtained from: Analytic software- [employer].Unable to assess, verified per dr first profile    Based on information gathered for medication list, you may go to "Admission" then "Reconcile Home Medications" tabs to review and/or act upon those items.     The home medication list has been updated by the Pharmacy department.   Please read ALL comments highlighted in yellow.   Please address this information as you see fit.    Feel free to contact us if you have any questions or require assistance.        Current Facility-Administered Medications on File Prior to Encounter   Medication Dose Route Frequency Provider Last Rate Last Admin    triamcinolone acetonide injection 40 mg  40 mg Other Once MICHELE Morton MD         Current Outpatient Medications on File Prior to Encounter   Medication Sig Dispense Refill    alendronate (FOSAMAX) 70 MG tablet Take 70 mg by mouth every 7 days.      INVEGA SUSTENNA 234 mg/1.5 mL Syrg injection Inject 234 mg into the muscle every 28 days.      JARDIANCE 10 mg tablet Take 10 mg by mouth once daily.      metFORMIN (GLUCOPHAGE-XR) 500 MG ER 24hr tablet Take 500 mg by mouth every evening.      OLANZapine (ZYPREXA) 20 MG tablet Take 20 mg by mouth nightly.      atorvastatin (LIPITOR) 10 MG tablet Take 10 mg by mouth once daily.      cetirizine (ZYRTEC) 10 mg Cap       losartan (COZAAR) 100 MG tablet Take 100 mg by mouth once daily.      mirtazapine (REMERON) 15 MG tablet 1      oxybutynin (DITROPAN XL) 15 MG TR24 Take 1 tablet (15 mg total) by mouth once daily. 30 tablet 11    pantoprazole (PROTONIX) 20 MG tablet Take 1 tablet (20 mg total) by mouth once daily. 30 tablet 0    traZODone (DESYREL) 150 MG tablet Take 150 mg by mouth every evening.         Please address this " information as you see fit.  Feel free to contact us if you have any questions or require assistance.    Johnna Ballard  [phone]                .

## 2023-12-04 NOTE — NURSING
2300: Patient received as a direct admit from [facility] to room 564. Patient quickly hooked to continuous monitoring. VSS. Insulin drip infusing @ 0.05. D10 drip switched to D5 half NS per orders. Baseline . No change to current infusion. Patient AAO x 4 and follows commands. Breath sounds clear bilaterally on RA. Slightly tachypnic. Bowel sounds hypoactive. Patient complaining of Left upper quadrant abdominal pain. Denies nausea at this time. No skin issues noted.     0000: Patient's GAP noted to be closed on most recent BMP. CO2 still low at 17. Call placed to Dr. Butterfield to notify of this. Orders to stop insulin drip and transition to 5 units long acting and sliding scale. Stated to continue with close monitoring of BG for a while to ensure patient is tolerating. Will continue with POC.

## 2023-12-04 NOTE — PROGRESS NOTES
[facility]           Pharmacy        Current Drug Shortage     Due to national backorder and [facility] is critically low on inventory of Dextrose 50% (D50) Syringes and Vials, pharmacy has automatically switched from D50% to D10% IVPB at the equivalent dose until resolution of the shortage per P&T approved protocol.               Ping Kahn, PharmD  [phone]

## 2023-12-04 NOTE — PROGRESS NOTES
[facility] Hospital Medicine Progress Note    Primary Team: [facility] Hospitalist Team B  Attending Physician: Jose Echevarria MD  Resident: Chloe  Intern: Ramirez    Subjective:    Overall feelink OK. Still complaining of epigastric abdominal pain with tenderness but has been able to eat, is drinking lots of water, does not feel like eating much yet.     Currently stepping down from ICU     Objective:     Last 24 Hour Vital Signs:  BP  Min: 99/52  Max: 189/91  Temp  Av.4 °F (36.9 °C)  Min: 98 °F (36.7 °C)  Max: 99 °F (37.2 °C)  Pulse  Av.6  Min: 85  Max: 129  Resp  Av.2  Min: 17  Max: 30  SpO2  Av.5 %  Min: 94 %  Max: 100 %  Height  Av' (152.4 cm)  Min: 5' (152.4 cm)  Max: 5' (152.4 cm)  Weight  Av kg (161 lb 0.3 oz)  Min: 72.6 kg (160 lb)  Max: 73.5 kg (162 lb 0.6 oz)  I/O last 3 completed shifts:  In: 3361.8 [P.O.:150; I.V.:1211.8; IV Piggyback:2000]  Out: 800 [Urine:800]    Physical Examination:  General: A&Ox3, resting comfortably in bed  HEENT: EOMI, moist mucus membranes w/ no erythema noted, no facial asymmetry noted  Neck: Trachea midline, no masses, no lymphadenopathy  Cardiovascular: Regular rate and rhythm, normal S1 and S2, no murmurs, rubs or gallops  Pulm: CTAB, no wheezes or crackles; no respiratory distress  Abdomen: Soft, mid-epigastric-tender;non-distended; no organomegaly  Skin: No rashes or erythema noted, no ecchymosis  Extremities: Atraumatic, no cyanosis or edema  Pulses: 2+ and symmetric  Neurological: A&Ox3, 5/5 upper and lower extremity strength, face midline, smile symmetric  Psychiatric: Normal mood and affect, thought content normal      Laboratory:  Laboratory Data Reviewed: yes  Pertinent Findings:  Leukocytosis increased slightly, 17 from 16.93, HGB/HCT dropped 13.1/36.9    Microbiology Data Reviewed: yes  Pertinent Findings:  U-microscopy 'few' bacteria, blood cultures pending    Other Results:  Radiology Data Reviewed: yes  Pertinent Findings:  WNL    Current  Medications:     Infusions:       Scheduled:   atorvastatin  40 mg Oral Daily    enoxparin  40 mg Subcutaneous Daily    losartan  50 mg Oral Daily        PRN:  acetaminophen, dextrose 10%, dextrose 10%, glucagon (human recombinant), glucose, glucose, insulin aspart U-100, ondansetron, sodium chloride 0.9%      Lines and Day Number of Therapy:  PIV, hand, d2    Assessment:     Katelynn Castro is a 58 y.o.female with T2DM, HTN, HLD, schizophrenia and mixed incontinence who presented to the [facility] ED 12/3/23 with abdominal pain, nausea, vomiting and diarrhea x 1 day, found to be in DKA. She was admitted to [facility] medicine for treatment in the ICU and currently stepping down to the floor for further management and improvement of regimen prior to discharge.     Plan:     #DKA in setting of T2DM  - on presentation VBG with pH 7.265, bicarb 7, AG 30, BHB 5.5,    - K initially 5.0, came to 4.5; gave with 50mEqat 1825  - Last A1c 7.0% 12/23/22, only on Jardiance at home   - A1C obtained 8.6%  - sx's c/w possible gastroenteritis trigger, COVID/flu neg, UA non-infectious  PLAN:  - overnight: IV insulin 1 unit/mL at 0.2 U/kg/h until blood glucose </= 200mg/dL  - transitioned to SUB1 insulin 0017  - POCT glucose q 1H; glu <200 since 2112 (195)  - IVF: transitioned to D5 0.45NS, 125mL at 2325  - repeat A1c 8.6%  - pt may require long-acting insulin vs. will likely need to restart 2nd agent on discharge  - CXR WNL, no signs of PNA/infectious process  - blood cx pending    #Schizophrenia  - patient on Invega q 28 days (reports next dose is later this week)  - also endorses taking Zyprexa 20 mg and trazodone 150 mg qhs on med list  PLAN:  - given DKA and need for neurochecks + patient slightly sleepy and c/f interaction with Invega, will hold off on prescribing Zyprexa/Trazodone and attempt to verify with pharmacy today     #HTN  - pt denies taking medication at home  - BP ranged from 100s-190s/50s-90s in ED, overnight    PLAN:  - restart previously prescribed losartan at 50mg QD, will continue to monitor     #HLD  - denies taking home atorvastatin 10mg  - continue high-intensity statin, atorvastatin 40     #Mixed incontinence  - s/p mid-urethral sling (2018) by Dr. EVIE Pineda  - not currently taking home oxybutynin  - no acute issues    Code: full  DVT prophylaxis: Subq Enoxaparin, 40mg  Dispo: Internal medicine to continue bridge to euglycemia, evaluation of appropriate prescriptions for discharge    Jade Guzmán DO  [facility] Internal Medicine HO-I    [facility] Medicine Hospitalist Pager numbers:   [facility] Hospitalist Medicine Team A (Wale/Adamaris): [phone]  [facility] Hospitalist Medicine Team B (Luis Manuel/Cathy):  [phone]

## 2023-12-04 NOTE — PLAN OF CARE
12/04/23 1557   Admission   Initial VN Admission Questions Complete   Communication Issues? None   Shift   Virtual Nurse - Rounding Complete   Pain Management Interventions care clustered;diversional activity provided;relaxation techniques promoted;quiet environment facilitated   Virtual Nurse - Patient Verbalized Approval Of Camera Use;VN Rounding   Safety/Activity   Patient Rounds bed in low position;placement of personal items at bedside;bed wheels locked;call light in patient/parent reach;visualized patient;clutter free environment maintained;ID band on   Safety Promotion/Fall Prevention assistive device/personal item within reach;bed alarm set;diversional activities provided;Fall Risk reviewed with patient/family;Fall Risk signage in place;high risk medications identified;medications reviewed;side rails raised x 2;instructed to call staff for mobility   Positioning   Body Position position changed independently   Head of Bed (HOB) Positioning HOB elevated   Pain/Comfort/Sleep   Preferred Pain Scale number (Numeric Rating Pain Scale)     VN cued into patient's room for introduction with patient's permission.  VN role explained and informed patient that VN would be working with bedside nurse and rest of care team.  Plan of care reviewed. Fall risk and bed alarm protocol education provided.  Instructed patient to call for assistance.  Patient aware and agreeable.  Patient's chart, labs and vital signs reviewed.  Allowed time for questions.  No acute distress noted.  Will continue to be available as needed.

## 2023-12-04 NOTE — PLAN OF CARE
SW met with pt and pt's  Elia [phone] at bedside this AM to complete DCA. Pt reported 8/10 pain but was in a pleasant mood throughout assessment. Pt reported that she lives at home with her  and will have him help transport her home at time of d/c. Pt does not use any HME and is fully independent in her ADL's and still drives and is not current with a HH company. Pt did not have any additional questions or concerns for sw at this time. SW will request f/u appts. White board updated with CM name and contact information.  Discharge brochure provided.  Pt encouraged to call with any questions or concerns.  Cm will continue to follow pt through transitions of care and assist with any discharge needs.    Tion Roland, SAMARIA  [phone]    No future appointments.     12/04/23 1123   Discharge Assessment   Assessment Type Discharge Planning Assessment   Confirmed/corrected address, phone number and insurance Yes   Confirmed Demographics Correct on Facesheet   Source of Information patient;family   When was your last doctors appointment?   (per pt last month)   Communicated CARLITOS with patient/caregiver Yes   Reason For Admission DKA   People in Home spouse   Facility Arrived From: HOME   Do you expect to return to your current living situation? Yes   Do you have help at home or someone to help you manage your care at home? Yes   Who are your caregiver(s) and their phone number(s)?  Elia [phone]   Prior to hospitilization cognitive status: Alert/Oriented   Current cognitive status: Alert/Oriented   Walking or Climbing Stairs ambulation difficulty, requires equipment   Home Accessibility wheelchair accessible   Home Layout Able to live on 1st floor   Equipment Currently Used at Home none   Readmission within 30 days? No   Patient currently being followed by outpatient case management? No   Do you currently have service(s) that help you manage your care at home? No   Do you take prescription  medications? No   Do you have prescription coverage? Yes   Coverage PHN   Do you have any problems affording any of your prescribed medications? No   Is the patient taking medications as prescribed? yes   Who is going to help you get home at discharge?  Elia [phone]   How do you get to doctors appointments? family or friend will provide   Are you on dialysis? No   Do you take coumadin? No   DME Needed Upon Discharge  none   Discharge Plan discussed with: Spouse/sig other   Name(s) and Number(s)  Eila [phone]   Transition of Care Barriers None   Discharge Plan A Home with family   OTHER   Name(s) of People in Home  Elia [phone]

## 2023-12-04 NOTE — CONSULTS
"Food & Nutrition  Education    Diet Education: diabetic  Time Spent: 15 minutes  Learners: pt and       Nutrition Education provided with handouts:   Carbohydrate Counting for People with Diabetes    Comments:  Pt reports she has been a diabetic for " a while". She has a glucometer at home and takes insulin. She has never been educated on a diabetic diet before. Educated her on appropriate daily carb servings, foods containing carbs & carb counting. Pt voiced understanding. Handouts were provided.     All questions and concerns answered. Dietitian's contact information provided.       Follow-Up: 12/11/23    Please Re-consult as needed        Thanks!   "

## 2023-12-04 NOTE — NURSING TRANSFER
Nursing Transfer Note      12/4/2023   3:40 PM    Nurse giving handoff:Lona  Nurse receiving handoff:Brian    Reason patient is being transferred: no longer needs ICU level of care    Transfer room 457:     Transfer via wheelchair    Transfer with cardiac monitoring    Transported by Nurse and transporter    Transfer Vital Signs:  Blood Pressure:113/66  Heart Rate:110  O2:0  Temperature:98.7  Respirations:18    Telemetry: Rhythm   Order for Tele Monitor? Yes    Additional Lines:  no     4eyes on Skin: yes    Medicines sent: yes    Any special needs or follow-up needed: bed alarm    Patient belongings transferred with patient: Yes    Chart send with patient: Yes    Notified: spouse at bedside    Patient reassessed at: 12/4/23, 1502 (date, time)  1  Upon arrival to floor: cardiac monitor applied, patient oriented to room, call bell in reach, and bed in lowest position

## 2023-12-05 VITALS
WEIGHT: 162.06 LBS | HEIGHT: 60 IN | DIASTOLIC BLOOD PRESSURE: 74 MMHG | TEMPERATURE: 99 F | OXYGEN SATURATION: 95 % | SYSTOLIC BLOOD PRESSURE: 122 MMHG | BODY MASS INDEX: 31.82 KG/M2 | HEART RATE: 107 BPM | RESPIRATION RATE: 18 BRPM

## 2023-12-05 PROBLEM — Z79.4 TYPE 2 DIABETES MELLITUS, WITH LONG-TERM CURRENT USE OF INSULIN: Status: ACTIVE | Noted: 2017-04-04

## 2023-12-05 PROBLEM — E11.10 DKA (DIABETIC KETOACIDOSIS): Status: RESOLVED | Noted: 2023-12-03 | Resolved: 2023-12-05

## 2023-12-05 PROBLEM — E11.10 TYPE 2 DIABETES MELLITUS WITH KETOACIDOSIS: Status: ACTIVE | Noted: 2017-04-04

## 2023-12-05 PROBLEM — R05.1 ACUTE COUGH: Status: RESOLVED | Noted: 2023-12-04 | Resolved: 2023-12-05

## 2023-12-05 LAB
ALBUMIN SERPL BCP-MCNC: 3.6 G/DL (ref 3.5–5.2)
ALP SERPL-CCNC: 88 U/L (ref 55–135)
ALT SERPL W/O P-5'-P-CCNC: 33 U/L (ref 10–44)
ANION GAP SERPL CALC-SCNC: 13 MMOL/L (ref 8–16)
AST SERPL-CCNC: 24 U/L (ref 10–40)
BASOPHILS # BLD AUTO: 0.07 K/UL (ref 0–0.2)
BASOPHILS NFR BLD: 0.5 % (ref 0–1.9)
BILIRUB SERPL-MCNC: 0.4 MG/DL (ref 0.1–1)
BUN SERPL-MCNC: 15 MG/DL (ref 6–20)
CALCIUM SERPL-MCNC: 8.7 MG/DL (ref 8.7–10.5)
CHLORIDE SERPL-SCNC: 107 MMOL/L (ref 95–110)
CO2 SERPL-SCNC: 20 MMOL/L (ref 23–29)
CREAT SERPL-MCNC: 0.8 MG/DL (ref 0.5–1.4)
DIFFERENTIAL METHOD: ABNORMAL
EOSINOPHIL # BLD AUTO: 0.1 K/UL (ref 0–0.5)
EOSINOPHIL NFR BLD: 0.4 % (ref 0–8)
ERYTHROCYTE [DISTWIDTH] IN BLOOD BY AUTOMATED COUNT: 13.1 % (ref 11.5–14.5)
EST. GFR  (NO RACE VARIABLE): >60 ML/MIN/1.73 M^2
GLUCOSE SERPL-MCNC: 155 MG/DL (ref 70–110)
HCT VFR BLD AUTO: 35.4 % (ref 37–48.5)
HGB BLD-MCNC: 12.6 G/DL (ref 12–16)
IMM GRANULOCYTES # BLD AUTO: 0.05 K/UL (ref 0–0.04)
IMM GRANULOCYTES NFR BLD AUTO: 0.4 % (ref 0–0.5)
LYMPHOCYTES # BLD AUTO: 5.5 K/UL (ref 1–4.8)
LYMPHOCYTES NFR BLD: 40 % (ref 18–48)
MAGNESIUM SERPL-MCNC: 2.1 MG/DL (ref 1.6–2.6)
MCH RBC QN AUTO: 32.6 PG (ref 27–31)
MCHC RBC AUTO-ENTMCNC: 35.6 G/DL (ref 32–36)
MCV RBC AUTO: 92 FL (ref 82–98)
MONOCYTES # BLD AUTO: 1.2 K/UL (ref 0.3–1)
MONOCYTES NFR BLD: 8.8 % (ref 4–15)
NEUTROPHILS # BLD AUTO: 6.9 K/UL (ref 1.8–7.7)
NEUTROPHILS NFR BLD: 49.9 % (ref 38–73)
NRBC BLD-RTO: 0 /100 WBC
PHOSPHATE SERPL-MCNC: 2.1 MG/DL (ref 2.7–4.5)
PLATELET # BLD AUTO: 184 K/UL (ref 150–450)
PMV BLD AUTO: 10.1 FL (ref 9.2–12.9)
POCT GLUCOSE: 131 MG/DL (ref 70–110)
POCT GLUCOSE: 208 MG/DL (ref 70–110)
POTASSIUM SERPL-SCNC: 3.6 MMOL/L (ref 3.5–5.1)
PROT SERPL-MCNC: 7 G/DL (ref 6–8.4)
RBC # BLD AUTO: 3.87 M/UL (ref 4–5.4)
SODIUM SERPL-SCNC: 140 MMOL/L (ref 136–145)
WBC # BLD AUTO: 13.81 K/UL (ref 3.9–12.7)

## 2023-12-05 PROCEDURE — 80053 COMPREHEN METABOLIC PANEL: CPT

## 2023-12-05 PROCEDURE — 25000003 PHARM REV CODE 250

## 2023-12-05 PROCEDURE — 85025 COMPLETE CBC W/AUTO DIFF WBC: CPT | Performed by: STUDENT IN AN ORGANIZED HEALTH CARE EDUCATION/TRAINING PROGRAM

## 2023-12-05 PROCEDURE — 84100 ASSAY OF PHOSPHORUS: CPT | Performed by: STUDENT IN AN ORGANIZED HEALTH CARE EDUCATION/TRAINING PROGRAM

## 2023-12-05 PROCEDURE — 11000001 HC ACUTE MED/SURG PRIVATE ROOM

## 2023-12-05 PROCEDURE — 83735 ASSAY OF MAGNESIUM: CPT

## 2023-12-05 PROCEDURE — 36415 COLL VENOUS BLD VENIPUNCTURE: CPT

## 2023-12-05 PROCEDURE — 25000003 PHARM REV CODE 250: Performed by: STUDENT IN AN ORGANIZED HEALTH CARE EDUCATION/TRAINING PROGRAM

## 2023-12-05 RX ORDER — DEXTROSE 4 G
100 TABLET,CHEWABLE ORAL DAILY
Qty: 1 EACH | Refills: 0 | Status: SHIPPED | OUTPATIENT
Start: 2023-12-05 | End: 2024-12-04

## 2023-12-05 RX ORDER — ATORVASTATIN CALCIUM 40 MG/1
40 TABLET, FILM COATED ORAL DAILY
Qty: 90 TABLET | Refills: 3 | Status: SHIPPED | OUTPATIENT
Start: 2023-12-05 | End: 2024-12-04

## 2023-12-05 RX ORDER — LOSARTAN POTASSIUM 50 MG/1
50 TABLET ORAL DAILY
Qty: 90 TABLET | Refills: 3 | Status: SHIPPED | OUTPATIENT
Start: 2023-12-05 | End: 2024-12-04

## 2023-12-05 RX ORDER — PEN NEEDLE, DIABETIC 30 GX3/16"
100 NEEDLE, DISPOSABLE MISCELLANEOUS DAILY
Qty: 100 EACH | Refills: 0 | Status: SHIPPED | OUTPATIENT
Start: 2023-12-05

## 2023-12-05 RX ORDER — BENZONATATE 100 MG/1
100 CAPSULE ORAL 3 TIMES DAILY PRN
Qty: 90 CAPSULE | Refills: 0 | Status: SHIPPED | OUTPATIENT
Start: 2023-12-05 | End: 2024-01-04

## 2023-12-05 RX ORDER — INSULIN GLARGINE 100 [IU]/ML
10 INJECTION, SOLUTION SUBCUTANEOUS NIGHTLY
Qty: 15 ML | Refills: 0 | Status: SHIPPED | OUTPATIENT
Start: 2023-12-05 | End: 2024-05-03

## 2023-12-05 RX ADMIN — ATORVASTATIN CALCIUM 40 MG: 40 TABLET, FILM COATED ORAL at 08:12

## 2023-12-05 RX ADMIN — POTASSIUM PHOSPHATE, MONOBASIC POTASSIUM PHOSPHATE, DIBASIC 15 MMOL: 224; 236 INJECTION, SOLUTION, CONCENTRATE INTRAVENOUS at 11:12

## 2023-12-05 RX ADMIN — LOSARTAN POTASSIUM 50 MG: 50 TABLET, FILM COATED ORAL at 08:12

## 2023-12-05 RX ADMIN — CETIRIZINE HYDROCHLORIDE 5 MG: 5 TABLET, FILM COATED ORAL at 08:12

## 2023-12-05 RX ADMIN — INSULIN ASPART 2 UNITS: 100 INJECTION, SOLUTION INTRAVENOUS; SUBCUTANEOUS at 12:12

## 2023-12-05 NOTE — DISCHARGE SUMMARY
[facility] Hospital Medicine Discharge Summary    Primary Team: [facility] Hospitalist Team A   Attending Physician: Jose Echevarria MD  Resident: Ruben   Intern: Ramirez      Date of Admit: 12/3/2023  Date of Discharge: 12/5/2023    Discharge to: home, self care  Condition: stable    Discharge Diagnoses     Patient Active Problem List   Diagnosis    Type 2 diabetes mellitus with ketoacidosis    Mixed incontinence    Sleep arousal disorder    Snoring    Hypertension    Schizophrenia    Osteoporosis    Acute cough       Consultants and Procedures     Consultants:  PCC    Procedures:   none    Imaging:  CXR:     Brief History of Present Illness      Katelynn Castro is a 58 y.o. female with PMHx of T2DM, HTN, HLD, schizophrenia and mixed incontinence who presented to [facility] ED on 12/3/23 with chief complaint of abdominal pain, nausea, vomiting and diarrhea x1 day.     She was found to be in DKA. VBG with pH 7.265, bicarb 7, AG 30, BHB 5.5, . Managed in ICU with 125 mL/hr IVF and insulin gtt. Stepped down from ICU on 12/4/23 and transition to long acting subq insulin.     Trigger for her DKA likely uncontrolled diabetes due to being off of medications for many years. She was started on Jardiance a few days prior to admission and recently got a glucometer. Plan to continue long acting insulin Lantus 10 u nightly in addition to Jardiance. May not need long term insulin as her A1c was 8.6% on admission. She will have primary care follow upwithin 1 week from discharge to further titrate her diabetes regimen. Also started on Losartan 50 for HTN and Lipitor 40 for HLD and cardiovascular risk. Did not initiate ASA 81 on discharge as patient has documented naproxen allergy, and upon further discussion with patient, she said it made her stomach very upset and her body shake. She also avoids ibuprofen and has never taken ASA. Encouraged her to discuss risks vs benefits with her PCP and possible desensitization to NSAIDs if she has a  "true allergy.     For the full HPI please refer to the History & Physical from this admission.    Hospital Course By Problem with Pertinent Findings     #DKA in setting of T2DM  - on presentation VBG with pH 7.265, bicarb 7, AG 30, BHB 5.5,   - A1C obtained 8.6%, only on Jardiance at home did not tolerate metformin due to diarrhea  - DKA trigger likely poorly controlled diabetes as no infectious or ischemic source has been found  - good blood glucose control with 10u levemir while inpatient   - transitioned to Lantus 10u nightly on discharge     #Schizophrenia  - patient on Invega q 28 days (reports next dose is later this week on Thursday)  - also endorses taking Zyprexa 20 mg and trazodone 150 mg qhs on med lis  - restarted oral home meds     #HTN  - pt denies taking medication at home but previously prescribed losartan   - BP improved with Losartan 50 mg daily  -continue Losartan on discharge      #HLD  - atorvastatin 40     #Mixed incontinence  - s/p mid-urethral sling (2018) by Dr. EVIE Pineda  - not currently taking home oxybutynin  - no acute issues     #Osteoporosis  - recently diagnosed on DEXA 11/2023  - takes alendronate qWednesday  - no recent falls or concern for acute fracture     Discharge Medications        Medication List        START taking these medications      benzonatate 100 MG capsule  Commonly known as: TESSALON  Take 1 capsule (100 mg total) by mouth 3 (three) times daily as needed for Cough.     blood sugar diagnostic Strp  100 each by Misc.(Non-Drug; Combo Route) route daily as needed.     blood-glucose meter Misc  100 Units by Misc.(Non-Drug; Combo Route) route once daily.     insulin glargine 100 units/mL SubQ pen  Commonly known as: LANTUS SOLOSTAR U-100 INSULIN  Inject 10 Units into the skin every evening. For diabetes glucose control     pen needle, diabetic 32 gauge x 5/32" Ndle  100 Units by Misc.(Non-Drug; Combo Route) route once daily. Please use to administer your long acting " "insulin            CHANGE how you take these medications      atorvastatin 40 MG tablet  Commonly known as: LIPITOR  Take 1 tablet (40 mg total) by mouth once daily.  What changed:   medication strength  how much to take     losartan 50 MG tablet  Commonly known as: COZAAR  Take 1 tablet (50 mg total) by mouth once daily.  What changed:   medication strength  how much to take            CONTINUE taking these medications      alendronate 70 MG tablet  Commonly known as: FOSAMAX     INVEGA SUSTENNA 234 mg/1.5 mL Syrg injection  Generic drug: paliperidone palmitate     JARDIANCE 10 mg tablet  Generic drug: empagliflozin     OLANZapine 20 MG tablet  Commonly known as: ZyPREXA     traZODone 150 MG tablet  Commonly known as: DESYREL     ZyrTEC 10 mg Cap  Generic drug: cetirizine            STOP taking these medications      metFORMIN 500 MG ER 24hr tablet  Commonly known as: GLUCOPHAGE-XR     mirtazapine 15 MG tablet  Commonly known as: REMERON     oxybutynin 15 MG Tr24  Commonly known as: DITROPAN XL     pantoprazole 20 MG tablet  Commonly known as: PROTONIX               Where to Get Your Medications        These medications were sent to [facility]  [address], [place] [address]      Hours: Mon-Fri, 8a-5:30p Phone: [phone]   atorvastatin 40 MG tablet  benzonatate 100 MG capsule  blood sugar diagnostic Strp  blood-glucose meter Misc  insulin glargine 100 units/mL SubQ pen  losartan 50 MG tablet  pen needle, diabetic 32 gauge x 5/32" Ndle         Discharge Information:   Diet:  Diabetic     Physical Activity:  As tolerated              Instructions:  1. Take all medications as prescribed  2. Keep all follow-up appointments  3. Return to the hospital or call your primary care physicians if any worsening symptoms such as fever, chest pain, shortness of breath, return of symptoms, or any other concerns.    Follow-Up Appointments:  PCP within 1 week of discharge     Dolly Miranda MD  [facility] " Internal Medicine and Pediatrics, PGY2

## 2023-12-05 NOTE — NURSING
Discharge orders noted. IVs and tele box removed. AVS printed and given to patient. VN reviewed AVS with patient. Patient left via wheelchair with family. No distress noted.

## 2023-12-05 NOTE — PROGRESS NOTES
[facility] Hospital Medicine Progress Note    Primary Team: [facility] Hospitalist Team A  Attending Physician: Jose Echevarria MD  Resident: Ruben  Intern: Ramirez    Subjective:      Stepped down from ICU yesterday. Did well overnight. Received diabetes education yesterday and met with dietician.  Abdominal pain resolved. Tessalon perles improved cough.    Objective:     Last 24 Hour Vital Signs:  BP  Min: 109/67  Max: 149/84  Temp  Av.2 °F (36.8 °C)  Min: 97.5 °F (36.4 °C)  Max: 98.7 °F (37.1 °C)  Pulse  Av.8  Min: 85  Max: 131  Resp  Av.2  Min: 17  Max: 40  SpO2  Av %  Min: 93 %  Max: 99 %  I/O last 3 completed shifts:  In: 4081.8 [P.O.:870; I.V.:1211.8; IV Piggyback:2000]  Out: 1600 [Urine:1600]    Physical Examination:  General: A&Ox3, resting comfortably in bed  HEENT: EOMI, moist mucus membranes w/ no erythema noted, no facial asymmetry noted  Neck: Trachea midline, no masses, no lymphadenopathy  Cardiovascular: Regular rate and rhythm, normal S1 and S2, no murmurs, rubs or gallops  Pulm: CTAB, no wheezes or crackles; no respiratory distress  Abdomen: Soft, non-tender;non-distended; no organomegaly  Skin: No rashes or erythema noted, no ecchymosis  Extremities: Atraumatic, no cyanosis or edema  Pulses: 2+ and symmetric  Neurological: A&Ox3, no focal deficit  Psychiatric: Normal mood and affect, thought content normal    Laboratory:  Laboratory Data Reviewed: yes  Pertinent Findings:  Leukocytosis improved  Bicarb 20  K 3.6    Microbiology Data Reviewed: yes  Pertinent Findings:  Blood cx no growth to date  Flu and covid negative    Other Results:  Radiology Data Reviewed: yes  Pertinent Findings:  WNL    Current Medications:     Infusions:       Scheduled:   atorvastatin  40 mg Oral Daily    cetirizine  5 mg Oral Daily    enoxparin  40 mg Subcutaneous Daily    insulin detemir U-100  10 Units Subcutaneous QHS    losartan  50 mg Oral Daily    OLANZapine zydis  20 mg Oral QHS    traZODone  150 mg Oral QHS         PRN:  acetaminophen, benzonatate, dextrose 10%, dextrose 10%, glucagon (human recombinant), glucose, glucose, insulin aspart U-100, ondansetron, sodium chloride 0.9%      Assessment:     Katelynn Castro is a 58 y.o.female with T2DM, HTN, HLD, schizophrenia and mixed incontinence who presented to the [facility] ED 12/3/23 with abdominal pain, nausea, vomiting and diarrhea x 1 day, found to be in DKA. She was admitted to [facility] for treatment of DKA.     Plan:     #DKA in setting of T2DM  - on presentation VBG with pH 7.265, bicarb 7, AG 30, BHB 5.5,   - A1C obtained 8.6%, only on Jardiance at home did not tolerate metformin due to diarrhea  - DKA trigger likely poorly controlled diabetes as no infectious or ischemic source has been found  PLAN:  - transitioned to Levemir 10u nightly with adequate blood sugar control     #Schizophrenia  - patient on Invega q 28 days (reports next dose is later this week on Thursday)  - also endorses taking Zyprexa 20 mg and trazodone 150 mg qhs on med list  PLAN:  - restarted home meds     #HTN  - pt denies taking medication at home but previously prescribed losartan   - BP improved with Losartan 50 mg daily  PLAN:  -continue Losartan on discharge      #HLD  - atorvastatin 40     #Mixed incontinence  - s/p mid-urethral sling (2018) by Dr. EVIE Pineda  - not currently taking home oxybutynin  - no acute issues    #Osteoporosis  - recently diagnosed on DEXA 11/2023  - takes alendronate qWednesday  - no recent falls or concern for acute fracture     Code: full  DVT prophylaxis: Subq Enoxaparin, 40mg  Dispo: plan for discharge home today    Dolly Miranda MD  [facility] P[facility] PGY2    [facility] Hospitalist Pager numbers:   [facility] Hospitalist Medicine Team A (Wale/Adamaris): [phone]  [facility] Hospitalist Medicine Team B (Luis Manuel/Cathy):  [phone]

## 2023-12-05 NOTE — PLAN OF CARE
Diabetes Self-Management Education & Support    Presents for: Type 2 diabetes, obesity    Type of Service: In Person Visit      ASSESSMENT:  Patient last seen 1/19/2022.  Patient started on Ozempic 11/4/2021 and increased from 1 mg to 2 mg over the past 6 months but does not notice any difference between the 2 doses and would like to have improved glucose and weight loss.  Discussed options and decided to finish out current Ozempic supply and start Wegovy with gradual titration and evaluate glucose, appetite suppression and weight loss.    Patient's most recent   Lab Results   Component Value Date    A1C 6.1 03/28/2023     is meeting goal of <7.0    Diabetes knowledge and skills assessment:   Patient is knowledgeable in diabetes management concepts related to: Healthy Eating, Monitoring, Problem Solving, and Healthy Coping    Continue education with the following diabetes management concepts: Being Active, Taking Medication, and Reducing Risks    Based on learning assessment above, most appropriate setting for further diabetes education would be: Individual setting.      PLAN    Finish out your Ozempic then start Mounjaro     Start Mounjaro at   2.5 mg weekly x 4 weeks increasing monthly as tolerated  5.0 mg weekly x 4 weeks increasing monthly as tolerated  7.5 mg weekly x 4 weeks increasing monthly as tolerated  10 mg weekly x 4 weeks increasing monthly as tolerated  12.5 mg weekly x 4 weeks increasing monthly as tolerated  15 mg weekly goal dose    Shirataki noodles      Tuna, HB egg little simms     More stir ray veggie     Darius diet diet     NO more ELIZABETH GUADALUPE    NON - food reward for working out   1x/ week x 1 month = pedicure   2x/ week x 1 month = massage  3x/ week x 2 months.... = tanning membership!!       Topics to cover at upcoming visits: As needed for ongoing diabetes self-management and and support    Follow-up: 6 months    See Care Plan for co-developed, patient-state behavior change goals.  EMERY  Pt is set to d/c home today. Pt will have her  Elia [phone] help transport her home later today. Pt is cleared from  pov. Rounds completed on pt. All questions addressed. Bedside nurse to discuss d/c medications. Discussed importance to attend all f/u appts and take medications as prescribed. Verbalized understanding. Case Management to sign off.     Tino Roland, SAMARIA  [phone]    Future Appointments   Date Time Provider Department Center   12/12/2023 10:00 AM Margie Liu MD [facility]        12/05/23 1421   Final Note   Assessment Type Final Discharge Note   Anticipated Discharge Disposition Home   What phone number can be called within the next 1-3 days to see how you are doing after discharge? [phone]   Post-Acute Status   Coverage PHN   Discharge Delays None known at this time        "provided for patient today.    Education Materials Provided:  Goals for Your Diabetes Care, Carbohydrate Counting, Medication Information on Mounjaro, and My Plate Planner      SUBJECTIVE/OBJECTIVE:  Diabetes education in the past 24mo: No  Diabetes type: Type 2  Disease course: Stable  How confident are you filling out medical forms by yourself:: Quite a bit  Diabetes management related comments/concerns: I have concerns to discuss.  I CRAVE sugar and I feel like I am always hungry.  Cultural Influences/Ethnic Background:  Not  or     Diabetes Symptoms & Complications:  Neuropathy: No  Polydipsia: No  Polyphagia: Yes  Polyuria: No  Visual change: Yes  Slow healing wounds: No  Autonomic neuropathy: No  CVA: No  Heart disease: No  Nephropathy: No  Peripheral neuropathy: No  Peripheral Vascular Disease: No  Retinopathy: No  Sexual dysfunction: No    Patient Problem List and Family Medical History reviewed for relevant medical history, current medical status, and diabetes risk factors.    Vitals:  Ht 1.549 m (5' 1\")   Wt 95 kg (209 lb 8 oz)   BMI 39.58 kg/m    Estimated body mass index is 39.58 kg/m  as calculated from the following:    Height as of this encounter: 1.549 m (5' 1\").    Weight as of this encounter: 95 kg (209 lb 8 oz).   Last 3 BP:   BP Readings from Last 3 Encounters:   04/19/23 111/67   04/06/23 110/66   03/28/23 (!) 121/90       History   Smoking Status    Never   Smokeless Tobacco    Never       Labs:  Lab Results   Component Value Date    A1C 6.1 03/28/2023     Lab Results   Component Value Date     04/19/2023     04/29/2022     Lab Results   Component Value Date     04/29/2022     Direct Measure HDL   Date Value Ref Range Status   04/29/2022 57 >=50 mg/dL Final   ]  GFR Estimate   Date Value Ref Range Status   04/06/2023 72 >60 mL/min/1.73m2 Final     Comment:     eGFR calculated using 2021 CKD-EPI equation.     No results found for: \"GFRESTBLACK\"  Lab Results " "  Component Value Date    CR 0.99 04/06/2023     No results found for: \"MICROALBUMIN\"    Healthy Eating:  Cultural/Taoist diet restrictions?: No  Meal planning/habits: None, Smaller portions, Frequent snacking  How many times a week on average do you eat food made away from home (restaurant/take-out)?: 3  Meals include: Lunch, Dinner, Morning Snack, Afternoon Snack, Evening Snack  Beverages: Water, Tea, Soda    Being Active:  Barrier to exercise: Time, Physical limitation    Monitoring:  Blood Glucose Meter: Unknown  Times checking blood sugar at home (number): 4  Times checking blood sugar at home (per): Month  Blood glucose trend: No change    90-day average 118 mg/dL out of 7 readings range is 96 to 154 mg/dL    Taking Medications:  Diabetes Medication(s)       Incretin Mimetic Agents       Semaglutide, 2 MG/DOSE, (OZEMPIC) 8 MG/3ML pen    Inject 2 mg Subcutaneous every 7 days     tirzepatide (MOUNJARO) 2.5 MG/0.5ML pen    Inject 2.5 mg Subcutaneous every 7 days     tirzepatide (MOUNJARO) 5 MG/0.5ML pen    Inject 5 mg Subcutaneous every 7 days            Current Treatments: Non-insulin Injectables    Problem Solving:                 Reducing Risks:  CAD Risks: Diabetes Mellitus, Obesity, Sedentary lifestyle, Stress  Has dilated eye exam at least once a year?: Yes  Sees dentist every 6 months?: Yes  Feet checked by healthcare provider in the last year?: No    Healthy Coping:  Informal Support system:: Children, [place] based, Family, Friends, Parent, Spouse, Other  Patient Activation Measure Survey Score:       No data to display                  Care Plan and Education Provided:  GLP-1 administration technique taught today. Patient verbalized understanding and was able to perform an accurate return demonstration of administration technique. Side effects were discussed, if patient has any abdominal pain, with or without nausea and/or vomiting, stop medication, call provider, clinic or go to the emergency " room.  Care Plan: Diabetes   Updates made by Daniela Adhikari RD since 11/2/2023 12:00 AM        Problem: HbA1C Not In Goal         Goal: Establish Regular Follow-Ups with PCP         Task: Discuss with PCP the recommended timing for patient's next follow up visit(s)    Responsible User: Daniela Adhikari RD        Task: Discuss schedule for PCP visits with patient Completed 11/2/2023   Responsible User: Daniela Adhikari RD        Goal: Get HbA1C Level in Goal         Task: Educate patient on diabetes education self-management topics Completed 11/2/2023   Responsible User: Daniela Adhikari RD        Task: Educate patient on benefits of regular glucose monitoring    Responsible User: Daniela Adhikari RD        Task: Refer patient to appropriate extended care team member, as needed (Medication Therapy Management, Behavioral Health, Physical Therapy, etc.)    Responsible User: Daniela Adhikari RD        Task: Discuss diabetes treatment plan with patient Completed 11/2/2023   Responsible User: Daniela Adhikari RD        Problem: Diabetes Self-Management Education Needed to Optimize Self-Care Behaviors         Goal: Understand diabetes pathophysiology and disease progression         Task: Provide education on diabetes pathophysiology and disease progression specfic to patient's diabetes type Completed 11/2/2023   Responsible User: Daniela Adhikari RD        Goal: Healthy Eating - follow a healthy eating pattern for diabetes         Task: Provide education on portion control and consistency in amount, composition and timing of food intake    Responsible User: Daniela Adhikari RD        Task: Provide education on managing carbohydrate intake (carbohydrate counting, plate planning method, etc.) Completed 11/2/2023   Responsible User: Daniela Adhikari RD        Task: Provide education on weight management Completed 11/2/2023   Responsible User: Daniela Adhikari RD        Task: Provide education on heart healthy eating Completed  11/2/2023   Responsible User: Daniela Adhikari RD        Task: Provide education on eating out    Responsible User: Daniela Adhikari RD        Task: Develop individualized healthy eating plan with patient    Responsible User: Daniela Adhikari RD        Goal: Being Active - get regular physical activity, working up to at least 150 minutes per week         Task: Provide education on relationship of activity to glucose and precautions to take if at risk for low glucose Completed 11/2/2023   Responsible User: Daniela Adhikari RD        Task: Discuss barriers to physical activity with patient Completed 11/2/2023   Responsible User: Daniela Adhikari RD        Task: Develop physical activity plan with patient    Responsible User: Daniela Adhikari RD        Task: Explore community resources including walking groups, assistance programs, and home videos    Responsible User: Daniela Adhikari RD        Goal: Monitoring - monitor glucose and ketones as directed         Task: Provide education on blood glucose monitoring (purpose, proper technique, frequency, glucose targets, interpreting results, when to use glucose control solution, sharps disposal)    Responsible User: Daniela Adhikari RD        Task: Provide education on continuous glucose monitoring (sensor placement, use of schuyler or /reader, understanding glucose trends, alerts and alarms, differences between sensor glucose and blood glucose)    Responsible User: Daniela Adhikari RD        Task: Provide education on ketone monitoring (when to monitor, frequency, etc.)    Responsible User: Daniela Adhikari RD        Goal: Taking Medication - patient is consistently taking medications as directed    This Visit's Progress: 100%   Note:    Patient to take diabetes related medication as directed       Task: Provide education on action of prescribed medication, including when to take and possible side effects    Responsible User: Daniela Adhikari RD        Task: Provide education on  insulin and injectable diabetes medications, including administration, storage, site selection and rotation for injection sites Completed 11/2/2023   Responsible User: Daniela Adhikari RD        Task: Discuss barriers to medication adherence with patient and provide management technique ideas as appropriate    Responsible User: Daniela Adhikari RD        Task: Provide education on frequency and refill details of medications    Responsible User: Daniela Adhikari RD        Goal: Problem Solving - know how to prevent and manage short-term diabetes complications         Task: Provide education on high blood glucose - causes, signs/symptoms, prevention and treatment Completed 11/2/2023   Responsible User: Daniela Adhikari RD        Task: Provide education on low blood glucose - causes, signs/symptoms, prevention, treatment, carrying a carbohydrate source at all times, and medical identification Completed 11/2/2023   Responsible User: Daniela Adhikari RD        Task: Provide education on safe travel with diabetes    Responsible User: Daniela Adhikari RD        Task: Provide education on how to care for diabetes on sick days    Responsible User: Daniela Adhikari RD        Task: Provide education on when to call a health care provider    Responsible User: Daniela Adhikari RD        Goal: Reducing Risks - know how to prevent and treat long-term diabetes complications         Task: Provide education on major complications of diabetes, prevention, early diagnostic measures and treatment of complications    Responsible User: Daniela Adhikari RD        Task: Provide education on recommended care for dental, eye and foot health Completed 11/2/2023   Responsible User: Daniela Adhikari RD        Task: Provide education on Hemoglobin A1c - goals and relationship to blood glucose levels Completed 11/2/2023   Responsible User: Daniela Adhikari RD        Task: Provide education on recommendations for heart health - lipid levels and goals, blood  pressure and goals, and aspirin therapy, if indicated    Responsible User: Daniela Adhikari RD        Task: Provide education on tobacco cessation    Responsible User: Daniela Adhikari RD        Goal: Healthy Coping - use available resources to cope with the challenges of managing diabetes         Task: Discuss recognizing feelings about having diabetes    Responsible User: Daniela Adhikari RD        Task: Provide education on the benefits of making appropriate lifestyle changes Completed 11/2/2023   Responsible User: Daniela Adhikari RD        Task: Provide education on benefits of utilizing support systems    Responsible User: Daniela Adhikari RD        Task: Discuss methods for coping with stress    Responsible User: Daniela Adhikari RD        Task: Provide education on when to seek professional counseling    Responsible User: Daniela Adhikari RD            Time Spent: 60 minutes  Encounter Type: Individual    Any diabetes medication dose changes were made via the CDE Protocol per the patient's referring provider. A copy of this encounter was shared with the provider.;

## 2023-12-05 NOTE — DISCHARGE INSTRUCTIONS
Ms. Castro, you were admitted tot [facility] for Diabetic Ketoacidosis which is when your sugars become high and your body is resistant to insulin so it starts producing acid. This is what made you feel very sick. You were given continuous insulin and a lot of IV fluids. Please continue to stay hydrated at home. We have started you on 10 units of long acting insulin (Lantus) nightly. Continue to take your Jardiance 10 mg daily as well.     You were also started on Losartan for high blood pressure treatment and Lipitor for high cholesterol and diabetes.     Please follow up with your primary care doctor within 1 week of your discharge. And please discuss the risks and benefits of starting Aspirin 81 mg daily due to your Naproxen allergy.      Please check your blood sugar at least once daily, but preferably upon waking up, before meals and before bed.

## 2023-12-05 NOTE — PLAN OF CARE
12/05/23 1519   AVS Confirmation   Discharge instructions and AVS given to and reviewed with patient and/or significant other. Yes       AVS printed and handed to patient by bedside nurse. VN reviewed discharge instructions with patient using teachback method.  Allowed time for questions, all questions answered.  Patient verbalized complete understanding of discharge instructions and voices no concerns. Discharge instructions complete. Patient awaiting  and bedside delivery of medications. Bedside nurse notified.

## 2023-12-09 LAB
BACTERIA BLD CULT: NORMAL
BACTERIA BLD CULT: NORMAL

## 2023-12-12 ENCOUNTER — OFFICE VISIT (OUTPATIENT)
Dept: PRIMARY CARE CLINIC | Facility: CLINIC | Age: 58
End: 2023-12-12
Payer: MEDICARE

## 2023-12-12 VITALS
DIASTOLIC BLOOD PRESSURE: 70 MMHG | HEART RATE: 91 BPM | BODY MASS INDEX: 31 KG/M2 | WEIGHT: 158.75 LBS | OXYGEN SATURATION: 97 % | SYSTOLIC BLOOD PRESSURE: 99 MMHG

## 2023-12-12 DIAGNOSIS — E11.10 DIABETIC KETOACIDOSIS WITHOUT COMA ASSOCIATED WITH TYPE 2 DIABETES MELLITUS: Primary | ICD-10-CM

## 2023-12-12 DIAGNOSIS — I10 HYPERTENSION, UNSPECIFIED TYPE: ICD-10-CM

## 2023-12-12 PROBLEM — Z79.4 TYPE 2 DIABETES MELLITUS, WITH LONG-TERM CURRENT USE OF INSULIN: Status: RESOLVED | Noted: 2017-04-04 | Resolved: 2023-12-12

## 2023-12-12 PROBLEM — E11.9 TYPE 2 DIABETES MELLITUS, WITH LONG-TERM CURRENT USE OF INSULIN: Status: RESOLVED | Noted: 2017-04-04 | Resolved: 2023-12-12

## 2023-12-12 PROCEDURE — 99214 OFFICE O/P EST MOD 30 MIN: CPT | Mod: S$GLB,,, | Performed by: INTERNAL MEDICINE

## 2023-12-12 PROCEDURE — 1111F PR DISCHARGE MEDS RECONCILED W/ CURRENT OUTPATIENT MED LIST: ICD-10-PCS | Mod: CPTII,S$GLB,, | Performed by: INTERNAL MEDICINE

## 2023-12-12 PROCEDURE — 1159F MED LIST DOCD IN RCRD: CPT | Mod: CPTII,S$GLB,, | Performed by: INTERNAL MEDICINE

## 2023-12-12 PROCEDURE — 3074F SYST BP LT 130 MM HG: CPT | Mod: CPTII,S$GLB,, | Performed by: INTERNAL MEDICINE

## 2023-12-12 PROCEDURE — 3078F DIAST BP <80 MM HG: CPT | Mod: CPTII,S$GLB,, | Performed by: INTERNAL MEDICINE

## 2023-12-12 PROCEDURE — 1160F PR REVIEW ALL MEDS BY PRESCRIBER/CLIN PHARMACIST DOCUMENTED: ICD-10-PCS | Mod: CPTII,S$GLB,, | Performed by: INTERNAL MEDICINE

## 2023-12-12 PROCEDURE — 99999 PR PBB SHADOW E&M-EST. PATIENT-LVL III: ICD-10-PCS | Mod: PBBFAC,,, | Performed by: INTERNAL MEDICINE

## 2023-12-12 PROCEDURE — 99999 PR PBB SHADOW E&M-EST. PATIENT-LVL III: CPT | Mod: PBBFAC,,, | Performed by: INTERNAL MEDICINE

## 2023-12-12 PROCEDURE — 4010F ACE/ARB THERAPY RXD/TAKEN: CPT | Mod: CPTII,S$GLB,, | Performed by: INTERNAL MEDICINE

## 2023-12-12 PROCEDURE — 1160F RVW MEDS BY RX/DR IN RCRD: CPT | Mod: CPTII,S$GLB,, | Performed by: INTERNAL MEDICINE

## 2023-12-12 PROCEDURE — 3008F PR BODY MASS INDEX (BMI) DOCUMENTED: ICD-10-PCS | Mod: CPTII,S$GLB,, | Performed by: INTERNAL MEDICINE

## 2023-12-12 PROCEDURE — 3078F PR MOST RECENT DIASTOLIC BLOOD PRESSURE < 80 MM HG: ICD-10-PCS | Mod: CPTII,S$GLB,, | Performed by: INTERNAL MEDICINE

## 2023-12-12 PROCEDURE — 4010F PR ACE/ARB THEARPY RXD/TAKEN: ICD-10-PCS | Mod: CPTII,S$GLB,, | Performed by: INTERNAL MEDICINE

## 2023-12-12 PROCEDURE — 1159F PR MEDICATION LIST DOCUMENTED IN MEDICAL RECORD: ICD-10-PCS | Mod: CPTII,S$GLB,, | Performed by: INTERNAL MEDICINE

## 2023-12-12 PROCEDURE — 99214 PR OFFICE/OUTPT VISIT, EST, LEVL IV, 30-39 MIN: ICD-10-PCS | Mod: S$GLB,,, | Performed by: INTERNAL MEDICINE

## 2023-12-12 PROCEDURE — 3052F PR MOST RECENT HEMOGLOBIN A1C LEVEL 8.0 - < 9.0%: ICD-10-PCS | Mod: CPTII,S$GLB,, | Performed by: INTERNAL MEDICINE

## 2023-12-12 PROCEDURE — 1111F DSCHRG MED/CURRENT MED MERGE: CPT | Mod: CPTII,S$GLB,, | Performed by: INTERNAL MEDICINE

## 2023-12-12 PROCEDURE — 3052F HG A1C>EQUAL 8.0%<EQUAL 9.0%: CPT | Mod: CPTII,S$GLB,, | Performed by: INTERNAL MEDICINE

## 2023-12-12 PROCEDURE — 3008F BODY MASS INDEX DOCD: CPT | Mod: CPTII,S$GLB,, | Performed by: INTERNAL MEDICINE

## 2023-12-12 PROCEDURE — 3074F PR MOST RECENT SYSTOLIC BLOOD PRESSURE < 130 MM HG: ICD-10-PCS | Mod: CPTII,S$GLB,, | Performed by: INTERNAL MEDICINE

## 2023-12-12 RX ORDER — FLUOXETINE HYDROCHLORIDE 40 MG/1
40 CAPSULE ORAL DAILY
COMMUNITY

## 2023-12-12 NOTE — PROGRESS NOTES
[facility]   New Visit Progress Note   Recent Hospital Discharge     PRESENTING HISTORY     Chief Complaint/Reason for Admission:  Follow up Hospital Discharge   PCP: Pauly Rolon FNP    History of Present Illness:  Ms. Katelynn Castro is a 58 y.o. female who was recently admitted to the hospital.      [facility] Medicine Discharge Summary  Primary Team: [facility] Hospitalist Team A         Attending Physician: Jose Echevarria MD  Resident: Ruben         Intern: Ramirez                  Date of Admit: 12/3/2023  Date of Discharge: 12/5/2023  Discharge to: home, self care  Condition: stable___________________________________________________________________    Today:  [facility] up.  Recently hospitalized for management of Diabetic ketoacidosis.  Outpatient diabetic regimen was Jardiance 10 mg daily.   Admitted to [facility] Medicine service in ICU level of care.  Insulin infusion and DKA protocol initiated.  HgBA1C 8.6.  Patient responded well to above interventions and supportive care.  Transitioned to Lantus 10 Units daily.   Home dose Jardiance restarted.   Losartan and Lipitor added to her home regimen.   Patient discharged to home.    Patient unaccompanied today.  Ambulatory and independent with ADL's.  Reports compliance with all medication and brought bottles for review.  Has glucometer and diabetic supplies.  Home CBG in range 111 - 160.  No reported hypoglycemic events.  Diabetic education sessions offered but patient deferred.     Review of Systems  General ROS: negative for chills, fever or weight loss  Psychological ROS: negative for hallucination, depression or suicidal ideation  Ophthalmic ROS: negative for blurry vision, photophobia or eye pain  ENT ROS: negative for epistaxis, sore throat or rhinorrhea  Respiratory ROS: no cough, shortness of breath, or wheezing  Cardiovascular ROS: no chest pain or dyspnea on exertion  Gastrointestinal  ROS: no abdominal pain, change in bowel habits, or black/ bloody stools  Genito-Urinary ROS: no dysuria, trouble voiding, or hematuria  Musculoskeletal ROS: negative for gait disturbance or muscular weakness  Neurological ROS: no syncope or seizures; no ataxia  Dermatological ROS: negative for pruritis, rash and jaundice      PAST HISTORY:     Past Medical History:   Diagnosis Date    Arthritis     Diabetes mellitus     Glaucoma     Hyperlipidemia     Hypertension     Mixed incontinence     Neuropathy     Schizophrenia, schizo-affective     Sleep apnea        Past Surgical History:   Procedure Laterality Date     SECTION      x1    CYSTOSCOPY  2018    Procedure: CYSTOSCOPY;  Surgeon: Bebeto Chaves II, MD;  Location: [facility];  Service: Urology;;    GALL STONES      HERNIA REPAIR      HYSTERECTOMY      INSERTION OF MIDURETHRAL SLING N/A 2018    Procedure: SLING-MID URETHRAL;  Surgeon: Bebeto Chaves II, MD;  Location: [facility];  Service: Urology;  Laterality: N/A;  Site: midurethral       Family History   Problem Relation Age of Onset    Diabetes Father     Cancer Father     Diabetes Mother          MEDICATIONS & ALLERGIES:     Current Outpatient Medications on File Prior to Visit   Medication Sig Dispense Refill    alendronate (FOSAMAX) 70 MG tablet Take 70 mg by mouth every 7 days.      atorvastatin (LIPITOR) 40 MG tablet Take 1 tablet (40 mg total) by mouth once daily. 90 tablet 3    benzonatate (TESSALON) 100 MG capsule Take 1 capsule (100 mg total) by mouth 3 (three) times daily as needed for Cough. 90 capsule 0    blood sugar diagnostic Strp 100 each by Misc.(Non-Drug; Combo Route) route daily as needed. 100 each 0    cetirizine (ZYRTEC) 10 mg Cap       blood-glucose meter Misc 100 Units by Misc.(Non-Drug; Combo Route) route once daily. 1 each 0    insulin (LANTUS SOLOSTAR U-100 INSULIN) glargine 100 units/mL SubQ pen Inject 10 Units into the skin every evening. For diabetes glucose control  "15 mL 0    INVEGA SUSTENNA 234 mg/1.5 mL Syrg injection Inject 234 mg into the muscle every 28 days.      JARDIANCE 10 mg tablet Take 10 mg by mouth once daily.      losartan (COZAAR) 50 MG tablet Take 1 tablet (50 mg total) by mouth once daily. 90 tablet 3    OLANZapine (ZYPREXA) 20 MG tablet Take 20 mg by mouth nightly.      pen needle, diabetic 32 gauge x 5/32" Ndle 100 Units by Misc.(Non-Drug; Combo Route) route once daily. Please use to administer your long acting insulin 100 each 0    traZODone (DESYREL) 150 MG tablet Take 150 mg by mouth every evening.       No current facility-administered medications on file prior to visit.        Review of patient's allergies indicates:   Allergen Reactions    Naproxen Other (See Comments)     PATIENT EXPRESSED " THIS MEDICATION MAKES MY WHOLE BODY FEEL JITTERY."    Tramadol Other (See Comments)     PATIENT EXPRESSED " THIS MEDICATION MAKES MY WHOLE BODY FEEL JITTERY."       OBJECTIVE:     Vital Signs:  BP 99/70 (BP Location: Left arm, Patient Position: Sitting, BP Method: Small (Automatic))   Pulse 91   Wt 72 kg (158 lb 11.7 oz)   SpO2 97%   BMI 31.00 kg/m²   Wt Readings from Last 3 Encounters:   12/03/23 2300 73.5 kg (162 lb 0.6 oz)   12/03/23 1401 72.6 kg (160 lb)   11/07/23 0841 72.6 kg (160 lb)   03/04/23 0349 71.7 kg (158 lb)     Body mass index is 31 kg/m².        Physical Exam:  BP 99/70 (BP Location: Left arm, Patient Position: Sitting, BP Method: Small (Automatic))   Pulse 91   Wt 72 kg (158 lb 11.7 oz)   SpO2 97%   BMI 31.00 kg/m²   General appearance: alert, cooperative, no distress  Constitutional:Oriented to person, place, and time  + appears well-developed and well-nourished.   HEENT: Normocephalic, atraumatic, neck symmetrical, no nasal discharge   Eyes: conjunctivae/corneas clear, PERRL, EOM's intact  Lungs: clear to auscultation bilaterally, no dullness to percussion bilaterally  Heart: regular rate and rhythm without rub; no displacement of the " "PMI   Abdomen: soft, non-tender; bowel sounds normoactive; no organomegaly  Extremities: extremities symmetric; no clubbing, cyanosis, or edema  Integument: Skin color, texture, turgor normal; no rashes; hair distrubution normal  Neurologic: Alert and oriented X 3, normal strength, normal coordination and gait  Psychiatric: no pressured speech; normal affect; no evidence of impaired cognition     Laboratory  Lab Results   Component Value Date    WBC 13.81 (H) 12/05/2023    HGB 12.6 12/05/2023    HCT 35.4 (L) 12/05/2023    MCV 92 12/05/2023     12/05/2023     BMP  Lab Results   Component Value Date     12/05/2023    K 3.6 12/05/2023     12/05/2023    CO2 20 (L) 12/05/2023    BUN 15 12/05/2023    CREATININE 0.8 12/05/2023    CALCIUM 8.7 12/05/2023    ANIONGAP 13 12/05/2023    EGFRNORACEVR >60 12/05/2023     Lab Results   Component Value Date    ALT 33 12/05/2023    AST 24 12/05/2023    ALKPHOS 88 12/05/2023    BILITOT 0.4 12/05/2023     No results found for: "INR", "PROTIME"  Lab Results   Component Value Date    HGBA1C 8.6 (H) 12/03/2023       ASSESSMENT & PLAN:       Diabetic ketoacidosis without coma associated with type 2 diabetes mellitus  - recent hospitalization as above  - Lantus initiated and home dose Jardiance continued at discharge  - previous intolerance to Metformin  - patient deferred diabetic education     Hypertension, unspecified type  - continue current regimen    Patient will be released from hospital follow up clinic.  She will see her primary care team at [facility] 12/13/23.  Records will be shared for coordination of care.   Instructions for the patient:      Scheduled Follow-up :  Future Appointments   Date Time Provider Department Center   12/12/2023 10:00 AM Margie Liu MD [facility] [place]       Post Visit Medication List:     Medication List            Accurate as of December 12, 2023 10:35 AM. If you have any questions, ask your nurse or doctor. " "               CONTINUE taking these medications      alendronate 70 MG tablet  Commonly known as: FOSAMAX     atorvastatin 40 MG tablet  Commonly known as: LIPITOR  Take 1 tablet (40 mg total) by mouth once daily.     BD ULTRA-FINE KIKI PEN NEEDLE 32 gauge x 5/32" Ndle  Generic drug: pen needle, diabetic  100 Units by Misc.(Non-Drug; Combo Route) route once daily. Please use to administer your long acting insulin     benzonatate 100 MG capsule  Commonly known as: TESSALON  Take 1 capsule (100 mg total) by mouth 3 (three) times daily as needed for Cough.     blood sugar diagnostic Strp  100 each by Misc.(Non-Drug; Combo Route) route daily as needed.     blood-glucose meter Misc  100 Units by Misc.(Non-Drug; Combo Route) route once daily.     INVEGA SUSTENNA 234 mg/1.5 mL Syrg injection  Generic drug: paliperidone palmitate     JARDIANCE 10 mg tablet  Generic drug: empagliflozin     LANTUS SOLOSTAR U-100 INSULIN glargine 100 units/mL SubQ pen  Generic drug: insulin  Inject 10 Units into the skin every evening. For diabetes glucose control     losartan 50 MG tablet  Commonly known as: COZAAR  Take 1 tablet (50 mg total) by mouth once daily.     OLANZapine 20 MG tablet  Commonly known as: ZyPREXA     PROzac 40 MG capsule  Generic drug: FLUoxetine     traZODone 150 MG tablet  Commonly known as: DESYREL     ZyrTEC 10 mg Cap  Generic drug: cetirizine              Signing Physician:  Margie Liu MD    "

## 2024-01-12 DIAGNOSIS — Z12.31 SCREENING MAMMOGRAM, ENCOUNTER FOR: Primary | ICD-10-CM

## 2024-02-06 ENCOUNTER — HOSPITAL ENCOUNTER (OUTPATIENT)
Dept: RADIOLOGY | Facility: HOSPITAL | Age: 59
Discharge: HOME | End: 2024-02-06
Attending: NURSE PRACTITIONER
Payer: MEDICARE

## 2024-02-06 DIAGNOSIS — Z12.31 SCREENING MAMMOGRAM, ENCOUNTER FOR: ICD-10-CM

## 2024-02-06 PROCEDURE — 77063 BREAST TOMOSYNTHESIS BI: CPT | Mod: 26,,, | Performed by: RADIOLOGY

## 2024-02-06 PROCEDURE — 77067 SCR MAMMO BI INCL CAD: CPT | Mod: TC,PN

## 2024-02-06 PROCEDURE — 77067 SCR MAMMO BI INCL CAD: CPT | Mod: 26,,, | Performed by: RADIOLOGY

## 2024-03-11 PROBLEM — E11.10 DKA (DIABETIC KETOACIDOSIS): Status: RESOLVED | Noted: 2023-12-03 | Resolved: 2024-03-11

## 2024-10-29 ENCOUNTER — LAB VISIT (OUTPATIENT)
Dept: LAB | Facility: HOSPITAL | Age: 59
End: 2024-10-29
Attending: PSYCHIATRY & NEUROLOGY
Payer: MEDICARE

## 2024-10-29 DIAGNOSIS — Z79.899 OTHER LONG TERM (CURRENT) DRUG THERAPY: ICD-10-CM

## 2024-10-29 DIAGNOSIS — F20.0 PARANOID SCHIZOPHRENIA: Primary | ICD-10-CM

## 2024-10-29 LAB
ALBUMIN SERPL BCP-MCNC: 4.6 G/DL (ref 3.5–5.2)
ALP SERPL-CCNC: 92 U/L (ref 38–126)
ALT SERPL W/O P-5'-P-CCNC: 47 U/L (ref 10–44)
ANION GAP SERPL CALC-SCNC: 6 MMOL/L (ref 8–16)
AST SERPL-CCNC: 34 U/L (ref 15–46)
BASOPHILS # BLD AUTO: 0.03 K/UL (ref 0–0.2)
BASOPHILS NFR BLD: 0.3 % (ref 0–1.9)
BILIRUB SERPL-MCNC: 0.4 MG/DL (ref 0.1–1)
CALCIUM SERPL-MCNC: 9.1 MG/DL (ref 8.7–10.5)
CHLORIDE SERPL-SCNC: 107 MMOL/L (ref 95–110)
CHOLEST SERPL-MCNC: 160 MG/DL (ref 120–199)
CHOLEST/HDLC SERPL: 2.7 {RATIO} (ref 2–5)
CO2 SERPL-SCNC: 27 MMOL/L (ref 23–29)
CREAT SERPL-MCNC: 0.79 MG/DL (ref 0.5–1.4)
DIFFERENTIAL METHOD BLD: ABNORMAL
EOSINOPHIL # BLD AUTO: 0.1 K/UL (ref 0–0.5)
EOSINOPHIL NFR BLD: 0.5 % (ref 0–8)
ERYTHROCYTE [DISTWIDTH] IN BLOOD BY AUTOMATED COUNT: 12.2 % (ref 11.5–14.5)
EST. GFR  (NO RACE VARIABLE): >60 ML/MIN/1.73 M^2
ESTIMATED AVG GLUCOSE: 131 MG/DL (ref 68–131)
GLUCOSE SERPL-MCNC: 120 MG/DL (ref 70–110)
HBA1C MFR BLD: 6.2 % (ref 4–5.6)
HCT VFR BLD AUTO: 42.2 % (ref 37–48.5)
HDLC SERPL-MCNC: 59 MG/DL (ref 40–75)
HDLC SERPL: 36.9 % (ref 20–50)
HGB BLD-MCNC: 14.4 G/DL (ref 12–16)
IMM GRANULOCYTES # BLD AUTO: 0.02 K/UL (ref 0–0.04)
IMM GRANULOCYTES NFR BLD AUTO: 0.2 % (ref 0–0.5)
LDLC SERPL CALC-MCNC: 89.6 MG/DL (ref 63–159)
LYMPHOCYTES # BLD AUTO: 3.9 K/UL (ref 1–4.8)
LYMPHOCYTES NFR BLD: 41.4 % (ref 18–48)
MCH RBC QN AUTO: 32.1 PG (ref 27–31)
MCHC RBC AUTO-ENTMCNC: 34.1 G/DL (ref 32–36)
MCV RBC AUTO: 94 FL (ref 82–98)
MONOCYTES # BLD AUTO: 0.6 K/UL (ref 0.3–1)
MONOCYTES NFR BLD: 6.7 % (ref 4–15)
NEUTROPHILS # BLD AUTO: 4.8 K/UL (ref 1.8–7.7)
NEUTROPHILS NFR BLD: 50.9 % (ref 38–73)
NONHDLC SERPL-MCNC: 101 MG/DL
NRBC BLD-RTO: 0 /100 WBC
PLATELET # BLD AUTO: 189 K/UL (ref 150–450)
PMV BLD AUTO: 10.1 FL (ref 9.2–12.9)
POTASSIUM SERPL-SCNC: 4.6 MMOL/L (ref 3.5–5.1)
PROT SERPL-MCNC: 8.2 G/DL (ref 6–8.4)
RBC # BLD AUTO: 4.48 M/UL (ref 4–5.4)
SODIUM SERPL-SCNC: 140 MMOL/L (ref 136–145)
TRIGL SERPL-MCNC: 57 MG/DL (ref 30–150)
UUN UR-MCNC: 13 MG/DL (ref 7–17)
WBC # BLD AUTO: 9.44 K/UL (ref 3.9–12.7)

## 2024-10-29 PROCEDURE — 85025 COMPLETE CBC W/AUTO DIFF WBC: CPT | Mod: PN | Performed by: PSYCHIATRY & NEUROLOGY

## 2024-10-29 PROCEDURE — 36415 COLL VENOUS BLD VENIPUNCTURE: CPT | Mod: PN | Performed by: PSYCHIATRY & NEUROLOGY

## 2024-10-29 PROCEDURE — 83036 HEMOGLOBIN GLYCOSYLATED A1C: CPT | Performed by: PSYCHIATRY & NEUROLOGY

## 2024-10-29 PROCEDURE — 80061 LIPID PANEL: CPT | Performed by: PSYCHIATRY & NEUROLOGY

## 2024-10-29 PROCEDURE — 80053 COMPREHEN METABOLIC PANEL: CPT | Mod: PN | Performed by: PSYCHIATRY & NEUROLOGY

## 2025-03-31 ENCOUNTER — HOSPITAL ENCOUNTER (EMERGENCY)
Facility: HOSPITAL | Age: 60
Discharge: HOME | End: 2025-03-31
Attending: STUDENT IN AN ORGANIZED HEALTH CARE EDUCATION/TRAINING PROGRAM
Payer: MEDICARE

## 2025-03-31 VITALS
RESPIRATION RATE: 18 BRPM | WEIGHT: 161 LBS | TEMPERATURE: 98 F | DIASTOLIC BLOOD PRESSURE: 69 MMHG | HEART RATE: 77 BPM | SYSTOLIC BLOOD PRESSURE: 142 MMHG | HEIGHT: 61 IN | OXYGEN SATURATION: 99 % | BODY MASS INDEX: 30.4 KG/M2

## 2025-03-31 DIAGNOSIS — M54.16 LUMBAR RADICULAR PAIN: ICD-10-CM

## 2025-03-31 DIAGNOSIS — M79.605 PAIN OF LEFT LOWER EXTREMITY: Primary | ICD-10-CM

## 2025-03-31 PROCEDURE — 63600175 PHARM REV CODE 636 W HCPCS: Mod: ER | Performed by: STUDENT IN AN ORGANIZED HEALTH CARE EDUCATION/TRAINING PROGRAM

## 2025-03-31 PROCEDURE — 99284 EMERGENCY DEPT VISIT MOD MDM: CPT | Mod: 25,ER

## 2025-03-31 PROCEDURE — 96372 THER/PROPH/DIAG INJ SC/IM: CPT | Performed by: STUDENT IN AN ORGANIZED HEALTH CARE EDUCATION/TRAINING PROGRAM

## 2025-03-31 RX ORDER — IBUPROFEN 400 MG/1
400 TABLET ORAL EVERY 6 HOURS PRN
Qty: 20 TABLET | Refills: 0 | Status: SHIPPED | OUTPATIENT
Start: 2025-03-31

## 2025-03-31 RX ORDER — ORPHENADRINE CITRATE 30 MG/ML
30 INJECTION INTRAMUSCULAR; INTRAVENOUS
Status: COMPLETED | OUTPATIENT
Start: 2025-03-31 | End: 2025-03-31

## 2025-03-31 RX ORDER — TIZANIDINE 2 MG/1
2 TABLET ORAL EVERY 6 HOURS PRN
Qty: 10 TABLET | Refills: 0 | Status: SHIPPED | OUTPATIENT
Start: 2025-03-31

## 2025-03-31 RX ORDER — KETOROLAC TROMETHAMINE 30 MG/ML
30 INJECTION, SOLUTION INTRAMUSCULAR; INTRAVENOUS
Status: COMPLETED | OUTPATIENT
Start: 2025-03-31 | End: 2025-03-31

## 2025-03-31 RX ORDER — ORPHENADRINE CITRATE 100 MG/1
100 TABLET, EXTENDED RELEASE ORAL
Status: DISCONTINUED | OUTPATIENT
Start: 2025-03-31 | End: 2025-03-31

## 2025-03-31 RX ADMIN — ORPHENADRINE CITRATE 30 MG: 60 INJECTION INTRAMUSCULAR; INTRAVENOUS at 04:03

## 2025-03-31 RX ADMIN — KETOROLAC TROMETHAMINE 30 MG: 30 INJECTION, SOLUTION INTRAMUSCULAR at 04:03

## 2025-03-31 NOTE — ED PROVIDER NOTES
"NAME:  Katelynn Castro  CSN:     [id number]  MRN:    [id number]  ADMIT DATE: 3/31/2025        eMERGENCY dEPARTMENT eNCOUnter    CHIEF COMPLAINT    Chief Complaint   Patient presents with    Leg Pain     PT to the Er with left leg pain from "thigh to lower leg" for about a week. PT states she went to get up about 2 hours ago to go to the bathroom and she fell. Denies injury.        HPI    Katelynn Castro is a 59 y.o. female with a past medical history of  has a past medical history of Arthritis, Diabetes mellitus, Glaucoma, Hyperlipidemia, Hypertension, Mixed incontinence, Neuropathy, Schizophrenia, schizo-affective, and Sleep apnea.     she presents to the ED due to due days of pain to the left anterior leg.  Worse with certain positions and movements.  Notes pain is so severe it makes it difficult for her to walk.  No numbness or tingling.  Denies any pain in the back.  No known injury.  Has not tried anything for it at home thus far.  Could not sleep tonight and came here for further management.  Mercedes works at [employer]    HPI       PAST MEDICAL HISTORY  Past Medical History:   Diagnosis Date    Arthritis     Diabetes mellitus     Glaucoma     Hyperlipidemia     Hypertension     Mixed incontinence     Neuropathy     Schizophrenia, schizo-affective     Sleep apnea        SURGICAL HISTORY    Past Surgical History:   Procedure Laterality Date     SECTION      x1    CYSTOSCOPY  2018    Procedure: CYSTOSCOPY;  Surgeon: Bebeto Chaves II, MD;  Location: [facility];  Service: Urology;;    GALL STONES      HERNIA REPAIR      HYSTERECTOMY      INSERTION OF MIDURETHRAL SLING N/A 2018    Procedure: SLING-MID URETHRAL;  Surgeon: Bebeto Chaves II, MD;  Location: [facility];  Service: Urology;  Laterality: N/A;  Site: midurethral       FAMILY HISTORY    Family History   Problem Relation Name Age of Onset    Diabetes Father      Cancer Father      Diabetes Mother         SOCIAL " "HISTORY    Social History     Socioeconomic History    Marital status:     Years of education: 12   Tobacco Use    Smoking status: Never    Smokeless tobacco: Never   Substance and Sexual Activity    Alcohol use: Yes     Comment: beer-2-3/day    Drug use: Yes     Types: Marijuana    Sexual activity: Not Currently     Social Drivers of Health     Financial Resource Strain: High Risk (10/11/2024)    Received from [facility] SDOH Screening     In the past year, have you been unable to get any of the following when you really needed them? choose all that apply.: Clothing   Housing Stability: High Risk (10/11/2024)    Received from [facility] SDOH Screening     In the past year, have you been unable to get any of the following when you really needed them? choose all that apply.: Utilities (electric, gas, and water)       MEDICATIONS  Current Outpatient Medications   Medication Instructions    alendronate (FOSAMAX) 70 mg, Oral, Every 7 days    atorvastatin (LIPITOR) 40 mg, Oral, Daily    BD ULTRA-FINE KIKI PEN NEEDLE 100 Units, Misc.(Non-Drug; Combo Route), Daily, Please use to administer your long acting insulin    blood sugar diagnostic Strp 100 each, Misc.(Non-Drug; Combo Route), Daily PRN    cetirizine (ZYRTEC) 10 mg Cap No dose, route, or frequency recorded.    FLUoxetine (PROZAC) 40 mg, Oral, Daily    ibuprofen (ADVIL,MOTRIN) 400 mg, Oral, Every 6 hours PRN    INVEGA SUSTENNA 234 mg, Intramuscular, Every 28 days    JARDIANCE 10 mg, Oral, Daily    LANTUS SOLOSTAR U-100 INSULIN 10 Units, Subcutaneous, Nightly, For diabetes glucose control    losartan (COZAAR) 50 mg, Oral, Daily    OLANZapine (ZYPREXA) 20 mg, Oral, Nightly    tiZANidine (ZANAFLEX) 2 mg, Oral, Every 6 hours PRN    traZODone (DESYREL) 150 mg, Oral, Nightly       ALLERGIES    Review of patient's allergies indicates:   Allergen Reactions    Naproxen Other (See Comments)     PATIENT EXPRESSED " THIS MEDICATION MAKES MY WHOLE " "BODY FEEL JITTERY."    Tramadol Other (See Comments)     PATIENT EXPRESSED " THIS MEDICATION MAKES MY WHOLE BODY FEEL JITTERY."         REVIEW OF SYSTEMS   Review of Systems       PHYSICAL EXAM    Reviewed Triage Note    VITAL SIGNS:   ED Triage Vitals [03/31/25 0354]   Encounter Vitals Group      BP (!) 142/69      Systolic BP Percentile       Diastolic BP Percentile       Pulse 77      Resp 18      Temp 98 °F (36.7 °C)      Temp Source Oral      SpO2 99 %      Weight 161 lb      Height 5' 1"      Head Circumference       Peak Flow       Pain Score       Pain Loc       Pain Education       Exclude from Growth Chart        Patient Vitals for the past 24 hrs:   BP Temp Temp src Pulse Resp SpO2 Height Weight   03/31/25 0354 (!) 142/69 98 °F (36.7 °C) Oral 77 18 99 % 5' 1" (1.549 m) 73 kg (161 lb)       Physical Exam    Nursing note and vitals reviewed.  Constitutional: She appears well-developed and well-nourished.   HENT:   Head: Normocephalic and atraumatic.   Eyes: EOM are normal. Pupils are equal, round, and reactive to light.   Neck: Neck supple.   Normal range of motion.  Cardiovascular:  Normal rate, regular rhythm and intact distal pulses.           Pulmonary/Chest: Breath sounds normal. No respiratory distress.   Abdominal: Abdomen is soft. There is no abdominal tenderness.   Musculoskeletal:         General: No tenderness or edema. Normal range of motion.      Cervical back: Normal range of motion and neck supple.      Comments: She is able to hold her left lower extremity against gravity without difficulty.  5/5 plantar flexion and dorsiflexion of the left lower extremity.  No sensory deficit.  Pain distribution is in the L2-3 dermatome of the left leg.  Strong DP and PT pulse.  No overlying skin changes.     Neurological: She is alert and oriented to person, place, and time.   Skin: Skin is warm and dry.   Psychiatric: She has a normal mood and affect.            EKG     Interpreted by EM physician if " performed:               LABS  Pertinent labs reviewed. (See chart for details)   Labs Reviewed - No data to display      RADIOLOGY          Imaging Results    None           PROCEDURES    Procedures      ED COURSE & MEDICAL DECISION MAKING    Pertinent Labs & Imaging studies reviewed. (See chart for details and specific orders.)          Summary of review of records:   MRI from 2016 of the lumbar spine:   T12-L1: No significant thecal sac or foraminal narrowing.     L1-2, L2-3: Right eccentric disc bulge.  Mild prominence of the   ligamentum flavum.   No significant thecal sac or foraminal narrowing.     L3-4: Thin circumferential disc bulge.  Mild bilateral facet   arthropathy.   Minimal thecal sac narrowing.  No significant foraminal narrowing     L4-5: Circumferential disc bulge.  Bilateral facet arthropathy and   ligamentum flavum hypertrophy.   Mild thecal sac narrowing, questionable indentation of the of the   traversing right L5 nerve root.  No significant foraminal narrowing.     L5-S1: Posterior disc protrusion.  Bilateral facet arthropathy.   No significant thecal sac or foraminal narrowing.       Follows with primary care with last visit in December of 2024.    Chemistry from October of 2024 notes normal renal function    Medical Decision Making  Amount and/or Complexity of Data Reviewed  External Data Reviewed: labs, radiology and notes.    Risk  Prescription drug management.      Katelynn Castro is a 59 y.o. female who presents with 3 days of worsening pain to the left anterior thigh.  No known injury.  Normal strength and sensation throughout the left lower extremity.  Intact distal pulses.  Clinically no suspicion for ischemic limb, infection.  Considered but doubt cauda equina syndrome, osteomyelitis, diskitis.  Clinically most consistent with radicular pain.  Referral to spine clinic provided.  Encouraged close follow up with primary care.  Reasons to return discussed and all questions  addressed.  Advise not to operate any heavy machinery while taking muscle relaxer.    No acute emergent medical condition has been identified. The patient appears to be low risk for an emergent medical condition is appropriate for discharge with outpatient f/u as detailed in discharge instructions for reevaluation and possible continued outpatient workup and management. I have discussed the workup with the patient, who has verbalized understanding of the plan and need for outpatient follow-up.  This evaluation does not preclude the development of an emergent condition so in addition, I have advised the patient that they can return to the ED at any time with worsening or change of their symptoms, or with any other medical complaint.               Medications   ketorolac injection 30 mg (has no administration in time range)   orphenadrine injection 30 mg (has no administration in time range)                  FINAL IMPRESSION    Final diagnoses:  [M79.605] Pain of left lower extremity (Primary)  [M54.16] Lumbar radicular pain       DISPOSITION  Patient discharged in stable condition        ED Prescriptions       Medication Sig Dispense Start Date End Date Auth. Provider    ibuprofen (ADVIL,MOTRIN) 400 MG tablet Take 1 tablet (400 mg total) by mouth every 6 (six) hours as needed. 20 tablet 3/31/2025 -- Amy Garcia, DO    tiZANidine (ZANAFLEX) 2 MG tablet Take 1 tablet (2 mg total) by mouth every 6 (six) hours as needed. 10 tablet 3/31/2025 -- Amy Garcia, DO          Follow-up Information       Follow up With Specialties Details Why Contact Info    Your Primary Care Physician  Schedule an appointment as soon as possible for a visit       [facility] - Emergency Dept Emergency Medicine  As needed, If symptoms worsen [address] [facility]  Emergency Department  [facility] [address] [phone]              DISCLAIMER: This note was prepared with M*modal voice recognition transcription software. Nel  syntax, mangled pronouns, and other bizarre constructions may be attributed to that software system.             Amy Garcia,   03/31/25 042

## 2025-03-31 NOTE — Clinical Note
"Katelynn Castro (Jeanette) was seen and treated in our emergency department on 3/31/2025.  She may return to work on 04/01/2025.       If you have any questions or concerns, please don't hesitate to call.      Amy Garcia, DO"

## 2025-04-21 ENCOUNTER — OFFICE VISIT (OUTPATIENT)
Dept: PODIATRY | Facility: CLINIC | Age: 60
End: 2025-04-21
Payer: MEDICARE

## 2025-04-21 DIAGNOSIS — B35.1 ONYCHOMYCOSIS DUE TO DERMATOPHYTE: ICD-10-CM

## 2025-04-21 DIAGNOSIS — E11.9 ENCOUNTER FOR DIABETIC FOOT EXAM: Primary | ICD-10-CM

## 2025-04-21 PROCEDURE — 99203 OFFICE O/P NEW LOW 30 MIN: CPT | Mod: S$GLB,,, | Performed by: STUDENT IN AN ORGANIZED HEALTH CARE EDUCATION/TRAINING PROGRAM

## 2025-04-21 PROCEDURE — 4010F ACE/ARB THERAPY RXD/TAKEN: CPT | Mod: CPTII,S$GLB,, | Performed by: STUDENT IN AN ORGANIZED HEALTH CARE EDUCATION/TRAINING PROGRAM

## 2025-04-21 PROCEDURE — 1159F MED LIST DOCD IN RCRD: CPT | Mod: CPTII,S$GLB,, | Performed by: STUDENT IN AN ORGANIZED HEALTH CARE EDUCATION/TRAINING PROGRAM

## 2025-04-21 PROCEDURE — 99999 PR PBB SHADOW E&M-EST. PATIENT-LVL III: CPT | Mod: PBBFAC,,, | Performed by: STUDENT IN AN ORGANIZED HEALTH CARE EDUCATION/TRAINING PROGRAM

## 2025-04-21 NOTE — PROGRESS NOTES
Subjective:     Patient ID: Katelynn Castro is a 59 y.o. female.    Chief Complaint: Diabetic Foot Exam (PCP CHERELLE Rolon,4/1/25), Routine Foot Care, and Diabetes Mellitus    Katelynn is a 59 y.o. female who presents to the clinic upon referral from Dr. Rolon  for evaluation and treatment of diabetic feet. Katelynn has a past medical history of Arthritis, Diabetes mellitus, Glaucoma, Hyperlipidemia, Hypertension, Mixed incontinence, Neuropathy, Schizophrenia, schizo-affective, and Sleep apnea. Patient relates no major problem with feet. Only complaints today consist of here for annual foot exam. Complains of thickened elongated toenails that are difficult to trim    PCP: No, Primary Doctor    Date Last Seen by PCP:     Hemoglobin A1C   Date Value Ref Range Status   10/29/2024 6.2 (H) 4.0 - 5.6 % Final     Comment:     ADA Screening Guidelines:  5.7-6.4%  Consistent with prediabetes  >or=6.5%  Consistent with diabetes    High levels of fetal hemoglobin interfere with the HbA1C  assay. Heterozygous hemoglobin variants (HbS, HgC, etc)do  not significantly interfere with this assay.   However, presence of multiple variants may affect accuracy.     12/03/2023 8.6 (H) 4.0 - 5.6 % Final     Comment:     ADA Screening Guidelines:  5.7-6.4%  Consistent with prediabetes  >or=6.5%  Consistent with diabetes    High levels of fetal hemoglobin interfere with the HbA1C  assay. Heterozygous hemoglobin variants (HbS, HgC, etc)do  not significantly interfere with this assay.   However, presence of multiple variants may affect accuracy.     12/23/2022 7.0 (H) 4.0 - 5.6 % Final     Comment:     ADA Screening Guidelines:  5.7-6.4%  Consistent with prediabetes  >or=6.5%  Consistent with diabetes    High levels of fetal hemoglobin interfere with the HbA1C  assay. Heterozygous hemoglobin variants (HbS, HgC, etc)do  not significantly interfere with this assay.   However, presence of multiple variants may affect accuracy.            Review of Systems   Constitutional: Negative for chills, decreased appetite, diaphoresis and fever.   HENT:  Negative for congestion and hearing loss.    Cardiovascular:  Negative for chest pain, claudication, leg swelling and syncope.   Respiratory:  Negative for cough and shortness of breath.    Skin:  Positive for dry skin and nail changes. Negative for color change, flushing, itching, poor wound healing and rash.   Musculoskeletal:  Negative for arthritis, back pain, joint pain and joint swelling.   Gastrointestinal:  Negative for nausea and vomiting.   Neurological:  Negative for focal weakness and weakness.   Psychiatric/Behavioral:  Negative for altered mental status. The patient is not nervous/anxious.         Objective:     Physical Exam  Constitutional:       General: She is not in acute distress.     Appearance: She is well-developed. She is not diaphoretic.   Cardiovascular:      Comments: Dorsalis pedis and posterior tibial pulses are within normal limits. Skin temperature is within normal limits. Toes are cool to touch and feet are warm proximally. Hair growth is within normal limits. Skin is normotrophic and without hyperpigmentation. No edema noted. No spider veins or varicosities noted, bilaterally.   Musculoskeletal:         General: No tenderness.      Comments: Adequate joint range of motion without pain, limitation, nor crepitation to bilateral feet and ankle joints. Muscle strength is 5/5 in all groups bilaterally.       Lymphadenopathy:      Comments: Negative lymphangitic streaking    Skin:     General: Skin is warm and dry.      Findings: No lesion.      Comments: Skin is warm and dry, no acute signs of infection noted. No open wounds, macerations or hyperkeratotic lesions, bilaterally.     Toenails are thickened by 2-4 mm's, dystrophic, and are darkened in coloration with subungual fungal debris, bilaterally.  Skin is very dry, bilaterally.      Neurological:      Mental Status: She  "is alert and oriented to person, place, and time.      Motor: No abnormal muscle tone.      Comments: Light touch within normal limits. [place]-Ra[place] 5.07 monofilamant testing is within normal limits. Vibratory sensation within normal limits, bilaterally.    Psychiatric:         Behavior: Behavior normal.         Thought Content: Thought content normal.         Judgment: Judgment normal.           Assessment:      Encounter Diagnoses   Name Primary?    Encounter for diabetic foot exam Yes    Onychomycosis due to dermatophyte      Plan:     Katelynn Nagy" was seen today for diabetic foot exam, routine foot care and diabetes mellitus.    Diagnoses and all orders for this visit:    Encounter for diabetic foot exam  -     Foot Exam Performed    Onychomycosis due to dermatophyte      I counseled the patient on her conditions, their implications and medical management.  Discussed importance of keeping feet dry and changing socks and shoes on a regular basis to prevent spread of toenail fungus. Discussed treatment options for fungal toenails including topical home remedies, topical over the counter and prescription medications as well as oral prescription medications and laser treatment. All pros and cons discussed of each treatment. Patient elects to try OTC treatment  I advised her on keeping nails neatly trimmed, removing all sharp and loose edges, filing the nail as necessary to prevent damage to nail plate and prevent unnecessary pulling of toenail. Also advised to avoid tight fitting shoes to prevent pressure related issues. Recommend shoes with wide toe box or open toed shoe to reduce pressure.   Toenails debrided 1-10, using sterile nail nippers, as a courtesy, without incident. Patient tolerated well.  Annual diabetic foot exam performed today. Shoe inspection. Diabetic Foot Education. Patient reminded of the importance of good nutrition and blood sugar control to help prevent podiatric complications of " diabetes. Patient instructed on proper foot hygeine. We discussed wearing proper shoe gear, daily foot inspections, never walking without protective shoe gear, never putting sharp instruments to feet.  Return to clinic in 1 year, sooner PRN

## 2025-05-20 ENCOUNTER — HOSPITAL ENCOUNTER (OUTPATIENT)
Dept: RADIOLOGY | Facility: HOSPITAL | Age: 60
Discharge: HOME | End: 2025-05-20
Attending: NURSE PRACTITIONER
Payer: MEDICARE

## 2025-05-20 DIAGNOSIS — Z12.31 SCREENING MAMMOGRAM FOR BREAST CANCER: ICD-10-CM

## 2025-05-20 PROCEDURE — 77063 BREAST TOMOSYNTHESIS BI: CPT | Mod: TC,PN

## 2025-05-20 PROCEDURE — 77063 BREAST TOMOSYNTHESIS BI: CPT | Mod: 26,,, | Performed by: RADIOLOGY

## 2025-05-20 PROCEDURE — 77067 SCR MAMMO BI INCL CAD: CPT | Mod: 26,,, | Performed by: RADIOLOGY

## 2025-06-08 ENCOUNTER — HOSPITAL ENCOUNTER (EMERGENCY)
Facility: HOSPITAL | Age: 60
Discharge: HOME | End: 2025-06-08
Attending: EMERGENCY MEDICINE
Payer: MEDICARE

## 2025-06-08 VITALS
SYSTOLIC BLOOD PRESSURE: 172 MMHG | HEIGHT: 61 IN | HEART RATE: 96 BPM | OXYGEN SATURATION: 99 % | TEMPERATURE: 98 F | DIASTOLIC BLOOD PRESSURE: 80 MMHG | BODY MASS INDEX: 30.4 KG/M2 | RESPIRATION RATE: 16 BRPM | WEIGHT: 161 LBS

## 2025-06-08 DIAGNOSIS — M79.652 LEFT THIGH PAIN: ICD-10-CM

## 2025-06-08 PROCEDURE — 99284 EMERGENCY DEPT VISIT MOD MDM: CPT | Mod: 25

## 2025-06-08 RX ORDER — CAPSAICIN 0.1 G/100G
1 CREAM TOPICAL 2 TIMES DAILY PRN
Qty: 42.5 G | Refills: 0 | Status: SHIPPED | OUTPATIENT
Start: 2025-06-08

## 2025-06-08 NOTE — ED NOTES
"Patient seen in room, patient states " I had a pain in my inner thigh and was unable to move" Patient seen ambulating in room and steady on legs when changing into gown.   "

## 2025-06-08 NOTE — ED PROVIDER NOTES
"Encounter Date: 2025       History     Chief Complaint   Patient presents with    Leg Pain     Patient reports left anterior deep thigh pain that comes and goes. She has been seen for the same problem before but her meds are not helping. Denies dyspnea, numbness/tingling to the extremity. Denies trauma, strenuous activity.      Katelynn Castro is a 60 y.o. female who  has a past medical history of Arthritis, Diabetes mellitus, Glaucoma, Hyperlipidemia, Hypertension, Mixed incontinence, Neuropathy, Schizophrenia, schizo-affective, and Sleep apnea.    The patient presents to the ED due to thigh pain.  This started 3 days ago.  She has had similar symptoms before.  She describes pain present at rest as well as with movement.  She denies any additional pains numbness weakness or symptoms at this time.        Review of patient's allergies indicates:   Allergen Reactions    Naproxen Other (See Comments)     PATIENT EXPRESSED " THIS MEDICATION MAKES MY WHOLE BODY FEEL JITTERY."    Tramadol Other (See Comments)     PATIENT EXPRESSED " THIS MEDICATION MAKES MY WHOLE BODY FEEL JITTERY."     Past Medical History:   Diagnosis Date    Arthritis     Diabetes mellitus     Glaucoma     Hyperlipidemia     Hypertension     Mixed incontinence     Neuropathy     Schizophrenia, schizo-affective     Sleep apnea      Past Surgical History:   Procedure Laterality Date     SECTION      x1    CYSTOSCOPY  2018    Procedure: CYSTOSCOPY;  Surgeon: Bebeto Chaves II, MD;  Location: [facility];  Service: Urology;;    GALL STONES      HERNIA REPAIR      HYSTERECTOMY      INSERTION OF MIDURETHRAL SLING N/A 2018    Procedure: SLING-MID URETHRAL;  Surgeon: Bebeto Chaves II, MD;  Location: [facility];  Service: Urology;  Laterality: N/A;  Site: midurethral     Family History   Problem Relation Name Age of Onset    Diabetes Father      Cancer Father      Diabetes Mother       Social History[1]  Review of Systems "   Constitutional:  Negative for fever.   HENT:  Negative for sore throat.    Respiratory:  Negative for shortness of breath.    Cardiovascular:  Negative for chest pain.   Gastrointestinal:  Negative for nausea.   Genitourinary:  Negative for dysuria.   Musculoskeletal:  Positive for myalgias. Negative for back pain.   Skin:  Negative for rash.   Neurological:  Negative for weakness.   Hematological:  Does not bruise/bleed easily.       Physical Exam     Initial Vitals [06/08/25 0418]   BP Pulse Resp Temp SpO2   (!) 163/76 100 18 97.8 °F (36.6 °C) 100 %      MAP       --         Physical Exam    Constitutional: No distress.   HENT:   Head: Normocephalic and atraumatic.   Eyes: Conjunctivae are normal.   Cardiovascular:  Intact distal pulses.           Pulmonary/Chest: No respiratory distress.   Musculoskeletal:      Comments: LLE:   SILT  DP/ PT pulse palpable    Pain to L medial thigh  No skin changes noted      Neurological: She is alert.   Skin: Skin is warm and dry.   Psychiatric: She has a normal mood and affect.         ED Course   Procedures  Labs Reviewed - No data to display       Imaging Results              US Lower Extremity Veins Left (Final result)  Result time 06/08/25 08:51:25      Final result by Young Arreola MD (06/08/25 08:51:25)                   Impression:      No evidence of DVT in the left lower extremity.      Electronically signed by: Young Arreola MD  Date:    06/08/2025  Time:    08:51               Narrative:    EXAMINATION:  US LOWER EXTREMITY VEINS LEFT    CLINICAL HISTORY:  Pain in left thigh    TECHNIQUE:  Duplex and color flow Doppler evaluation and graded compression of the left lower extremity veins was performed.    COMPARISON:  None.    FINDINGS:  The imaged veins of the left lower extremity demonstrate normal gray scale graded compression, color flow and Doppler waveforms, normal respiratory phasicity and augmentation.  Comparison imaging of the right common femoral  vein is unremarkable.  No discreet fluid collections.  Normal sized left groin lymph node.                                       Medications - No data to display  Medical Decision Making  Differential Diagnosis includes, but is not limited to:  Fracture, dislocation, compartment syndrome, nerve injury/palsy, vascular injury, rhabdomyolysis, hemarthrosis, septic joint, bursitis, muscle strain, ligament tear/sprain, laceration with foreign body, abrasion, soft tissue contusion, osteoarthritis.      Risk  OTC drugs.  Decision regarding hospitalization.  Diagnosis or treatment significantly limited by social determinants of health.  Risk Details: US negative for DVT  Stable on reassessment  No signs to suggest neuro/vascular, infectious or emergent cause of symptoms today  Follow with PCP advised, return precautions discussed.    After taking into careful account the historical factors and physical exam findings of the patient's presentation today, in conjunction with the empirical and objective data obtained on ED workup, no acute emergent medical condition has been identified. The patient appears to be low risk for an emergent medical condition and I feel it is safe and appropriate at this time for the patient to be discharged to follow-up as detailed in their discharge instructions for reevaluation and possible continued outpatient workup and management. I have discussed the specifics of the workup with the patient and the patient has verbalized understanding of the details of the workup, the diagnosis, the treatment plan, and the need for outpatient follow-up.  Although the patient has no emergent etiology today this does not preclude the development of an emergent condition so in addition, I have advised the patient that they can return to the ED and/or activate EMS at any time with worsening of their symptoms, change of their symptoms, or with any other medical complaint.  The patient remained comfortable and  stable during their visit in the ED.  Discharge and follow-up instructions discussed with the patient who expressed understanding and willingness to comply with my recommendations.                                        Clinical Impression:  Final diagnoses:  [M79.652] Left thigh pain           Portions of this note were dictated using voice recognition software and may contain dictation related errors in spelling/grammar/syntax not found on text review     ED Disposition Condition    Discharge Stable          ED Prescriptions       Medication Sig Dispense Start Date End Date Auth. Provider    capsaicin 0.1 % Crea Apply 42.5 g topically 2 (two) times daily as needed (pain). Wash hands after use 42.5 g 6/8/2025 -- Dilip Gibbs Jr., MD          Follow-up Information    None                [1]   Social History  Tobacco Use    Smoking status: Never    Smokeless tobacco: Never   Substance Use Topics    Alcohol use: Yes     Comment: beer-2-3/day    Drug use: Yes     Types: Marijuana        Dilip Gibbs Jr., MD  06/10/25 0158

## 2025-06-08 NOTE — Clinical Note
"Katelynn Nagy" Castro was seen and treated in our emergency department on 6/8/2025.  She may return to work on 06/10/2025.       If you have any questions or concerns, please don't hesitate to call.      Dilip Gibbs MD     "